# Patient Record
Sex: FEMALE | Race: WHITE | Employment: FULL TIME | URBAN - METROPOLITAN AREA
[De-identification: names, ages, dates, MRNs, and addresses within clinical notes are randomized per-mention and may not be internally consistent; named-entity substitution may affect disease eponyms.]

---

## 2021-12-21 ENCOUNTER — OFFICE VISIT (OUTPATIENT)
Dept: FAMILY MEDICINE CLINIC | Age: 26
End: 2021-12-21
Payer: COMMERCIAL

## 2021-12-21 ENCOUNTER — HOSPITAL ENCOUNTER (OUTPATIENT)
Dept: LAB | Age: 26
Discharge: HOME | End: 2021-12-21
Payer: COMMERCIAL

## 2021-12-21 VITALS
BODY MASS INDEX: 39.34 KG/M2 | OXYGEN SATURATION: 99 % | WEIGHT: 208.38 LBS | SYSTOLIC BLOOD PRESSURE: 128 MMHG | DIASTOLIC BLOOD PRESSURE: 66 MMHG | HEIGHT: 61 IN | HEART RATE: 101 BPM | RESPIRATION RATE: 18 BRPM | TEMPERATURE: 98 F

## 2021-12-21 DIAGNOSIS — F32.A ANXIETY AND DEPRESSION: ICD-10-CM

## 2021-12-21 DIAGNOSIS — E66.9 OBESITY (BMI 30-39.9): ICD-10-CM

## 2021-12-21 DIAGNOSIS — F41.9 ANXIETY AND DEPRESSION: ICD-10-CM

## 2021-12-21 DIAGNOSIS — J45.40 MODERATE PERSISTENT ASTHMA, UNSPECIFIED WHETHER COMPLICATED: ICD-10-CM

## 2021-12-21 DIAGNOSIS — J45.40 MODERATE PERSISTENT ASTHMA, UNSPECIFIED WHETHER COMPLICATED: Primary | ICD-10-CM

## 2021-12-21 LAB
ALBUMIN SERPL-MCNC: 3.9 G/DL (ref 3.4–5)
ALBUMIN/GLOB SERPL: 1.1 {RATIO} (ref 0.8–1.7)
ALP SERPL-CCNC: 98 U/L (ref 45–117)
ALT SERPL-CCNC: 42 U/L (ref 13–56)
ANION GAP SERPL CALC-SCNC: 5 MMOL/L (ref 3–18)
AST SERPL-CCNC: 22 U/L (ref 10–38)
BASOPHILS # BLD: 0 K/UL (ref 0–0.1)
BASOPHILS NFR BLD: 0 % (ref 0–2)
BILIRUB SERPL-MCNC: 0.8 MG/DL (ref 0.2–1)
BUN SERPL-MCNC: 7 MG/DL (ref 7–18)
BUN/CREAT SERPL: 8 (ref 12–20)
CALCIUM SERPL-MCNC: 9.6 MG/DL (ref 8.5–10.1)
CHLORIDE SERPL-SCNC: 105 MMOL/L (ref 100–111)
CO2 SERPL-SCNC: 29 MMOL/L (ref 21–32)
CREAT SERPL-MCNC: 0.89 MG/DL (ref 0.6–1.3)
DIFFERENTIAL METHOD BLD: ABNORMAL
EOSINOPHIL # BLD: 0 K/UL (ref 0–0.4)
EOSINOPHIL NFR BLD: 1 % (ref 0–5)
ERYTHROCYTE [DISTWIDTH] IN BLOOD BY AUTOMATED COUNT: 12.9 % (ref 11.6–14.5)
GLOBULIN SER CALC-MCNC: 3.7 G/DL (ref 2–4)
GLUCOSE SERPL-MCNC: 89 MG/DL (ref 74–99)
HCT VFR BLD AUTO: 46.3 % (ref 35–45)
HGB BLD-MCNC: 14.8 G/DL (ref 12–16)
IMM GRANULOCYTES # BLD AUTO: 0 K/UL (ref 0–0.04)
IMM GRANULOCYTES NFR BLD AUTO: 0 % (ref 0–0.5)
LYMPHOCYTES # BLD: 1.9 K/UL (ref 0.9–3.6)
LYMPHOCYTES NFR BLD: 25 % (ref 21–52)
MCH RBC QN AUTO: 27.7 PG (ref 24–34)
MCHC RBC AUTO-ENTMCNC: 32 G/DL (ref 31–37)
MCV RBC AUTO: 86.5 FL (ref 78–100)
MONOCYTES # BLD: 0.5 K/UL (ref 0.05–1.2)
MONOCYTES NFR BLD: 7 % (ref 3–10)
NEUTS SEG # BLD: 4.9 K/UL (ref 1.8–8)
NEUTS SEG NFR BLD: 67 % (ref 40–73)
NRBC # BLD: 0 K/UL (ref 0–0.01)
NRBC BLD-RTO: 0 PER 100 WBC
PLATELET # BLD AUTO: 297 K/UL (ref 135–420)
PMV BLD AUTO: 10.8 FL (ref 9.2–11.8)
POTASSIUM SERPL-SCNC: 4.4 MMOL/L (ref 3.5–5.5)
PROT SERPL-MCNC: 7.6 G/DL (ref 6.4–8.2)
RBC # BLD AUTO: 5.35 M/UL (ref 4.2–5.3)
SODIUM SERPL-SCNC: 139 MMOL/L (ref 136–145)
TSH SERPL DL<=0.05 MIU/L-ACNC: 3.45 UIU/ML (ref 0.36–3.74)
WBC # BLD AUTO: 7.3 K/UL (ref 4.6–13.2)

## 2021-12-21 PROCEDURE — 99204 OFFICE O/P NEW MOD 45 MIN: CPT | Performed by: STUDENT IN AN ORGANIZED HEALTH CARE EDUCATION/TRAINING PROGRAM

## 2021-12-21 PROCEDURE — 84443 ASSAY THYROID STIM HORMONE: CPT

## 2021-12-21 PROCEDURE — 36415 COLL VENOUS BLD VENIPUNCTURE: CPT

## 2021-12-21 PROCEDURE — 85025 COMPLETE CBC W/AUTO DIFF WBC: CPT

## 2021-12-21 PROCEDURE — 80053 COMPREHEN METABOLIC PANEL: CPT

## 2021-12-21 RX ORDER — BUDESONIDE AND FORMOTEROL FUMARATE DIHYDRATE 160; 4.5 UG/1; UG/1
2 AEROSOL RESPIRATORY (INHALATION) 2 TIMES DAILY
COMMUNITY
End: 2021-12-21 | Stop reason: SDUPTHER

## 2021-12-21 RX ORDER — ALBUTEROL SULFATE 90 UG/1
2 AEROSOL, METERED RESPIRATORY (INHALATION)
COMMUNITY
End: 2022-01-11

## 2021-12-21 RX ORDER — BUDESONIDE AND FORMOTEROL FUMARATE DIHYDRATE 160; 4.5 UG/1; UG/1
2 AEROSOL RESPIRATORY (INHALATION) 2 TIMES DAILY
Qty: 10.2 G | Refills: 0 | Status: SHIPPED | OUTPATIENT
Start: 2021-12-21 | End: 2022-01-11 | Stop reason: SDUPTHER

## 2021-12-21 NOTE — PATIENT INSTRUCTIONS
Anxiety Disorder: Care Instructions  Your Care Instructions     Anxiety is a normal reaction to stress. Difficult situations can cause you to have symptoms such as sweaty palms and a nervous feeling. In an anxiety disorder, the symptoms are far more severe. Constant worry, muscle tension, trouble sleeping, nausea and diarrhea, and other symptoms can make normal daily activities difficult or impossible. These symptoms may occur for no reason, and they can affect your work, school, or social life. Medicines, counseling, and self-care can all help. Follow-up care is a key part of your treatment and safety. Be sure to make and go to all appointments, and call your doctor if you are having problems. It's also a good idea to know your test results and keep a list of the medicines you take. How can you care for yourself at home? · Take medicines exactly as directed. Call your doctor if you think you are having a problem with your medicine. · Go to your counseling sessions and follow-up appointments. · Recognize and accept your anxiety. Then, when you are in a situation that makes you anxious, say to yourself, \"This is not an emergency. I feel uncomfortable, but I am not in danger. I can keep going even if I feel anxious. \"  · Be kind to your body:  ? Relieve tension with exercise or a massage. ? Get enough rest.  ? Avoid alcohol, caffeine, nicotine, and illegal drugs. They can increase your anxiety level and cause sleep problems. ? Learn and do relaxation techniques. See below for more about these techniques. · Engage your mind. Get out and do something you enjoy. Go to a funny movie, or take a walk or hike. Plan your day. Having too much or too little to do can make you anxious. · Keep a record of your symptoms. Discuss your fears with a good friend or family member, or join a support group for people with similar problems. Talking to others sometimes relieves stress.   · Get involved in social groups, or volunteer to help others. Being alone sometimes makes things seem worse than they are. · Get at least 30 minutes of exercise on most days of the week to relieve stress. Walking is a good choice. You also may want to do other activities, such as running, swimming, cycling, or playing tennis or team sports. Relaxation techniques  Do relaxation exercises 10 to 20 minutes a day. You can play soothing, relaxing music while you do them, if you wish. · Tell others in your house that you are going to do your relaxation exercises. Ask them not to disturb you. · Find a comfortable place, away from all distractions and noise. · Lie down on your back, or sit with your back straight. · Focus on your breathing. Make it slow and steady. · Breathe in through your nose. Breathe out through either your nose or mouth. · Breathe deeply, filling up the area between your navel and your rib cage. Breathe so that your belly goes up and down. · Do not hold your breath. · Breathe like this for 5 to 10 minutes. Notice the feeling of calmness throughout your whole body. As you continue to breathe slowly and deeply, relax by doing the following for another 5 to 10 minutes:  · Tighten and relax each muscle group in your body. You can begin at your toes and work your way up to your head. · Imagine your muscle groups relaxing and becoming heavy. · Empty your mind of all thoughts. · Let yourself relax more and more deeply. · Become aware of the state of calmness that surrounds you. · When your relaxation time is over, you can bring yourself back to alertness by moving your fingers and toes and then your hands and feet and then stretching and moving your entire body. Sometimes people fall asleep during relaxation, but they usually wake up shortly afterward. · Always give yourself time to return to full alertness before you drive a car or do anything that might cause an accident if you are not fully alert.  Never play a relaxation tape while you drive a car. When should you call for help? Call 911 anytime you think you may need emergency care. For example, call if:    · You feel you cannot stop from hurting yourself or someone else. Keep the numbers for these national suicide hotlines: 2-791-515-TALK (8-401.731.4129) and 7-021-SBPVOCU (3-215.542.8942). If you or someone you know talks about suicide or feeling hopeless, get help right away. Watch closely for changes in your health, and be sure to contact your doctor if:    · You have anxiety or fear that affects your life.     · You have symptoms of anxiety that are new or different from those you had before. Where can you learn more? Go to http://www.maloney.com/  Enter P754 in the search box to learn more about \"Anxiety Disorder: Care Instructions. \"  Current as of: June 16, 2021               Content Version: 13.0  © 7359-9554 [employer]. Care instructions adapted under license by [employer] (which disclaims liability or warranty for this information). If you have questions about a medical condition or this instruction, always ask your healthcare professional. [place] [address] any warranty or liability for your use of this information. Learning About Asthma Triggers  What are asthma triggers? When you have asthma, some things can make your symptoms worse. These things are called triggers. Things that you're allergic to can trigger your asthma. These may include dust or dust mites, cockroach droppings, pet dander, or mold. Other things can also trigger your asthma, like smoke, colds or the flu, or air pollution. How do asthma triggers affect you? Triggers can make it harder for your lungs to work as they should. They can lead to sudden breathing problems and other symptoms. When you are around a trigger, an asthma attack is more likely.  If your symptoms are severe, you may need emergency treatment or have to go to the hospital for treatment. What can you do to avoid triggers? The best way to avoid your asthma triggers is to know what your triggers are. When you are having symptoms, note the things around you that might be causing them. Then look for patterns that may be triggering your symptoms. Record your triggers on a piece of paper or in an asthma diary. When you have your list of possible triggers, work with your doctor to find ways to avoid them. Here are some ways to avoid a few common triggers. · Don't smoke or allow others to smoke around you. If you need help quitting, talk to your doctor about stop-smoking programs and medicines. These can increase your chances of quitting for good. · If there is a lot of pollution, pollen, or dust outside, stay at home and keep your windows closed. Use an air conditioner or air filter in your home. Check your local weather report or newspaper for air quality and pollen reports. · Avoid colds and flu. Get a flu vaccine every year, as soon as it's available. If you must be around people with colds or the flu, wash your hands often. · Talk to your doctor about getting a pneumococcal vaccine shot. If you have had one before, ask your doctor if you need a second dose. To help prevent problems before they occur, take your controller medicine every day, not only when you have symptoms. Where can you learn more? Go to http://www.Appoet.com/  Enter S900 in the search box to learn more about \"Learning About Asthma Triggers. \"  Current as of: July 6, 2021               Content Version: 13.0  © 2006-2021 Healthwise, Incorporated. Care instructions adapted under license by [employer] (which disclaims liability or warranty for this information).  If you have questions about a medical condition or this instruction, always ask your healthcare professional. [place] [address] any warranty or liability for your use of this information.

## 2021-12-21 NOTE — PROGRESS NOTES
Samia Parsons, 31 y.o.,  female presents as a new patient  Chief Complaint   Patient presents with    New Patient    Asthma    Depression     positve PHQ9 screening at today's visit        SUBJECTIVE  Asthma  Diagnosed with asthma in high school. Several hospitalisation in the past.  [facility] [place] in January 2020 and noted improvement in condition. Aggravating factors: Cold, upper respiratory tract infection  Currently on symbicort inhaler, once a day   Admitted to [facility] on 2/25/2020 with acute hypoxemic respiratory failure due to pneumonia and asthma exacerbation. Allergic rhinitis  Seasonal allergic rhinitis (nasal congestion). Controlled on Zyrtec  Depression  Longstanding problem, since high school. Social anxiety. Treatment: CBT in high school. Denies taking medications. PHQ depression screening completed in the office today and as follows:  3 most recent PHQ Screens 12/21/2021   Little interest or pleasure in doing things More than half the days   Feeling down, depressed, irritable, or hopeless Nearly every day   Total Score PHQ 2 5   Trouble falling or staying asleep, or sleeping too much More than half the days   Feeling tired or having little energy Several days   Poor appetite, weight loss, or overeating More than half the days   Feeling bad about yourself - or that you are a failure or have let yourself or your family down More than half the days   Trouble concentrating on things such as school, work, reading, or watching TV Nearly every day   Moving or speaking so slowly that other people could have noticed; or the opposite being so fidgety that others notice Not at all   Thoughts of being better off dead, or hurting yourself in some way Not at all   PHQ 9 Score 15   How difficult have these problems made it for you to do your work, take care of your home and get along with others Somewhat difficult   C-SSRS completed.    Surgical history is remarkable of cholecystectomy in 2018    ROS:  Pertinent items are noted in HPI    OBJECTIVE    Physical Exam:     Visit Vitals  /66 (BP 1 Location: Left arm, BP Patient Position: Sitting, BP Cuff Size: Large adult)   Pulse (!) 101   Temp 98 °F (36.7 °C) (Temporal)   Resp 18   Ht 5' 1\" (1.549 m)   Wt 208 lb 6 oz (94.5 kg)   LMP 12/17/2021   SpO2 99%   BMI 39.37 kg/m²     General: alert, well-appearing, in no apparent distress or pain  Head: atraumatic. Non-tender maxillary and frontal sinuses  Eyes: Lids with no discharge, no matting, conjunctivae clear and non injected, full EOMs, PERLLA  Ears: pinna non-tender, external auditory canal patent, TM intact  Mouth/throat: teeth, gums, palate normal appearing, moist oral mucosa, tonsils non enlarged, pharynx non erythematous and no lesion  Neck: supple, no JVD , no carotid bruit, no adenopathy palpated  CVS: normal rate, regular rhythm, distinct S1 and S2, no murmurs, rubs clicks or gallops  Lungs: Breathing not labored, good chest excursion, clear to ausculation bilaterally, no crackles, wheezing or rhonchi noted  Abdomen: normoactive bowel sounds, soft, non-tender, no organomegaly  Extremities: no edema, no cyanosis,  Skin: warm, no lesions, rashes noted  Psych: alert and oriented x3, mood, insight, speech and affect normal    ASSESSMENT/PLAN  Diagnoses and all orders for this visit:    1. Moderate persistent asthma, unspecified whether complicated  -     budesonide-formoteroL (SYMBICORT) 160-4.5 mcg/actuation HFAA; Take 2 Puffs by inhalation two (2) times a day. -     CBC WITH AUTOMATED DIFF; Future  -     METABOLIC PANEL, COMPREHENSIVE; Future  Labs ordered. Continue with SMART therapy. Medication refilled. 2. Anxiety and depression  -     TSH 3RD GENERATION; Future  -     REFERRAL TO PSYCHOLOGY  Labs ordered to rule out possible somatic causes of anxiety. Treatment options discussed with the patient. Shared decision making performed. Referral to psychology placed. Self management of depression discussed. Healthy diet, regular sleep schedule, physical activity, reduce stress, maintain a balance, practice mindfulness, seek support from friends and family. Handout given. Patient advised to call the office for any worsening symptoms of depression. 3. Obesity (BMI 30-39.9)  -     CBC WITH AUTOMATED DIFF; Future  -     METABOLIC PANEL, COMPREHENSIVE; Future  -     TSH 3RD GENERATION; Future  Labs ordered. Managing weight with diet and exercise recommended. Examples of moderate physical activity, wellness workout, diet program (Weight Watches, Diet Workshop) discussed with the patient. Handout given. Follow-up and Dispositions    · Return in about 4 weeks (around 1/18/2022) for physical and fasting lans to be done 3-5 days prior .

## 2021-12-21 NOTE — PROGRESS NOTES
Chief Complaint   Patient presents with    New Patient    Asthma    Depression     positve PHQ9 screening at today's visit      1. \"Have you been to the ER, urgent care clinic since your last visit? Hospitalized since your last visit? \" No    2. \"Have you seen or consulted any other health care providers outside of the [address] [place] [address] since your last visit? \" No     3. For patients aged 39-70: Has the patient had a colonoscopy / FIT/ Cologuard? NA based on age or sex     If the patient is female:    3. For patients aged 41-77: Has the patient had a mammogram within the past 2 years? NA based on age or sex    11. For patients aged 21-65: Has the patient had a pap smear? No (not sexual active)     No immunization record noted on [employer].

## 2022-01-10 PROBLEM — J45.909 ASTHMA: Status: ACTIVE | Noted: 2020-02-25

## 2022-01-10 PROBLEM — J96.01 ACUTE RESPIRATORY FAILURE WITH HYPOXEMIA (HCC): Status: ACTIVE | Noted: 2020-02-25

## 2022-01-11 ENCOUNTER — OFFICE VISIT (OUTPATIENT)
Dept: FAMILY MEDICINE CLINIC | Age: 27
End: 2022-01-11
Payer: COMMERCIAL

## 2022-01-11 VITALS
SYSTOLIC BLOOD PRESSURE: 120 MMHG | HEART RATE: 100 BPM | DIASTOLIC BLOOD PRESSURE: 76 MMHG | OXYGEN SATURATION: 100 % | TEMPERATURE: 98 F | RESPIRATION RATE: 18 BRPM | HEIGHT: 61 IN | WEIGHT: 201 LBS | BODY MASS INDEX: 37.95 KG/M2

## 2022-01-11 DIAGNOSIS — F41.9 ANXIETY AND DEPRESSION: ICD-10-CM

## 2022-01-11 DIAGNOSIS — F32.A ANXIETY AND DEPRESSION: ICD-10-CM

## 2022-01-11 DIAGNOSIS — E66.9 OBESITY (BMI 30-39.9): ICD-10-CM

## 2022-01-11 DIAGNOSIS — J45.40 MODERATE PERSISTENT ASTHMA, UNSPECIFIED WHETHER COMPLICATED: ICD-10-CM

## 2022-01-11 DIAGNOSIS — Z23 ENCOUNTER FOR IMMUNIZATION: ICD-10-CM

## 2022-01-11 DIAGNOSIS — Z00.00 WELL WOMAN EXAM (NO GYNECOLOGICAL EXAM): Primary | ICD-10-CM

## 2022-01-11 PROCEDURE — 90715 TDAP VACCINE 7 YRS/> IM: CPT | Performed by: STUDENT IN AN ORGANIZED HEALTH CARE EDUCATION/TRAINING PROGRAM

## 2022-01-11 PROCEDURE — 99385 PREV VISIT NEW AGE 18-39: CPT | Performed by: STUDENT IN AN ORGANIZED HEALTH CARE EDUCATION/TRAINING PROGRAM

## 2022-01-11 RX ORDER — BUDESONIDE AND FORMOTEROL FUMARATE DIHYDRATE 160; 4.5 UG/1; UG/1
2 AEROSOL RESPIRATORY (INHALATION) 2 TIMES DAILY
Qty: 10.2 G | Refills: 0 | Status: SHIPPED | OUTPATIENT
Start: 2022-01-11 | End: 2022-01-11

## 2022-01-11 RX ORDER — BUDESONIDE AND FORMOTEROL FUMARATE DIHYDRATE 160; 4.5 UG/1; UG/1
2 AEROSOL RESPIRATORY (INHALATION) 2 TIMES DAILY
Qty: 10.2 G | Refills: 0 | Status: SHIPPED | OUTPATIENT
Start: 2022-01-11

## 2022-01-11 RX ORDER — ESCITALOPRAM OXALATE 5 MG/1
5 TABLET ORAL DAILY
Qty: 30 TABLET | Refills: 0 | Status: SHIPPED | OUTPATIENT
Start: 2022-01-11 | End: 2022-01-11

## 2022-01-11 RX ORDER — ESCITALOPRAM OXALATE 5 MG/1
5 TABLET ORAL DAILY
Qty: 30 TABLET | Refills: 0 | Status: SHIPPED | OUTPATIENT
Start: 2022-01-11 | End: 2022-02-11 | Stop reason: DRUGHIGH

## 2022-01-11 NOTE — PROGRESS NOTES
Chief Complaint   Patient presents with    Physical    Depression    Results     review of results      1. \"Have you been to the ER, urgent care clinic since your last visit? Hospitalized since your last visit? \" No    2. \"Have you seen or consulted any other health care providers outside of the [address] [place] [address] since your last visit? \" No     3. For patients aged 39-70: Has the patient had a colonoscopy / FIT/ Cologuard? No     If the patient is female:    4. For patients aged 41-77: Has the patient had a mammogram within the past 2 years? No    5. For patients aged 21-65: Has the patient had a pap smear? No (never been sexual active -does not want a pap smear)      Physician order obtained. Patient completed adult immunization consent form. Allergies, contraindications and recommendations reviewed with patient. Tdap vaccine administered IM right deltoid. Patient tolerated well.   Patient remained in office for 15 minutes after injection and no adverse reactions were noted.     Lot # [id number]  Exp Date: 11-  [address] # [id number]

## 2022-01-11 NOTE — PATIENT INSTRUCTIONS
Well Visit, Ages 25 to 48: Care Instructions  Overview     Well visits can help you stay healthy. Your doctor has checked your overall health and may have suggested ways to take good care of yourself. Your doctor also may have recommended tests. At home, you can help prevent illness with healthy eating, regular exercise, and other steps. Follow-up care is a key part of your treatment and safety. Be sure to make and go to all appointments, and call your doctor if you are having problems. It's also a good idea to know your test results and keep a list of the medicines you take. How can you care for yourself at home? · Get screening tests that you and your doctor decide on. Screening helps find diseases before any symptoms appear. · Eat healthy foods. Choose fruits, vegetables, whole grains, protein, and low-fat dairy foods. Limit fat, especially saturated fat. Reduce salt in your diet. · Limit alcohol. If you are a man, have no more than 2 drinks a day or 14 drinks a week. If you are a woman, have no more than 1 drink a day or 7 drinks a week. · Get at least 30 minutes of physical activity on most days of the week. Walking is a good choice. You also may want to do other activities, such as running, swimming, cycling, or playing tennis or team sports. Discuss any changes in your exercise program with your doctor. · Reach and stay at a healthy weight. This will lower your risk for many problems, such as obesity, diabetes, heart disease, and high blood pressure. · Do not smoke or allow others to smoke around you. If you need help quitting, talk to your doctor about stop-smoking programs and medicines. These can increase your chances of quitting for good. · Care for your mental health. It is easy to get weighed down by worry and stress. Learn strategies to manage stress, like deep breathing and mindfulness, and stay connected with your family and community.  If you find you often feel sad or hopeless, talk with your doctor. Treatment can help. · Talk to your doctor about whether you have any risk factors for sexually transmitted infections (STIs). You can help prevent STIs if you wait to have sex with a new partner (or partners) until you've each been tested for STIs. It also helps if you use condoms (male or female condoms) and if you limit your sex partners to one person who only has sex with you. Vaccines are available for some STIs, such as HPV. · Use birth control if it's important to you to prevent pregnancy. Talk with your doctor about the choices available and what might be best for you. · If you think you may have a problem with alcohol or drug use, talk to your doctor. This includes prescription medicines (such as amphetamines and opioids) and illegal drugs (such as cocaine and methamphetamine). Your doctor can help you figure out what type of treatment is best for you. · Protect your skin from too much sun. When you're outdoors from 10 a.m. to 4 p.m., stay in the shade or cover up with clothing and a hat with a wide brim. Wear sunglasses that block UV rays. Even when it's cloudy, put broad-spectrum sunscreen (SPF 30 or higher) on any exposed skin. · See a dentist one or two times a year for checkups and to have your teeth cleaned. · Wear a seat belt in the car. When should you call for help? Watch closely for changes in your health, and be sure to contact your doctor if you have any problems or symptoms that concern you. Where can you learn more? Go to http://www.Haiku Deck.com/  Enter P072 in the search box to learn more about \"Well Visit, Ages 25 to 48: Care Instructions. \"  Current as of: February 11, 2021               Content Version: 13.0  © 4407-4344 Healthwise, Incorporated. Care instructions adapted under license by [employer] (which disclaims liability or warranty for this information).  If you have questions about a medical condition or this instruction, always ask your healthcare professional. [place] [address] any warranty or liability for your use of this information. Tdap (Tetanus, Diphtheria, Pertussis) Vaccine: What You Need to Know  Why get vaccinated? Tdap vaccine can prevent tetanus, diphtheria, and pertussis. Diphtheria and pertussis spread from person to person. Tetanus enters the body through cuts or wounds. · TETANUS (T) causes painful stiffening of the muscles. Tetanus can lead to serious health problems, including being unable to open the mouth, having trouble swallowing and breathing, or death. · DIPHTHERIA (D) can lead to difficulty breathing, heart failure, paralysis, or death. · PERTUSSIS (aP), also known as \"whooping cough,\" can cause uncontrollable, violent coughing which makes it hard to breathe, eat, or drink. Pertussis can be extremely serious in babies and young children, causing pneumonia, convulsions, brain damage, or death. In teens and adults, it can cause weight loss, loss of bladder control, passing out, and rib fractures from severe coughing. Tdap vaccine  Tdap is only for children 7 years and older, adolescents, and adults. Adolescents should receive a single dose of Tdap, preferably at age 6 or 15 years. Pregnant women should get a dose of Tdap during every pregnancy, to protect the  from pertussis. Infants are most at risk for severe, life threatening complications from pertussis. Adults who have never received Tdap should get a dose of Tdap. Also, adults should receive a booster dose every 10 years, or earlier in the case of a severe and dirty wound or burn. Booster doses can be either Tdap or Td (a different vaccine that protects against tetanus and diphtheria but not pertussis). Tdap may be given at the same time as other vaccines.   Talk with your health care provider  Tell your vaccine provider if the person getting the vaccine:  · Has had an allergic reaction after a previous dose of any vaccine that protects against tetanus, diphtheria, or pertussis, or has any severe, life threatening allergies. · Has had a coma, decreased level of consciousness, or prolonged seizures within 7 days after a previous dose of any pertussis vaccine (DTP, DTaP, or Tdap). · Has seizures or another nervous system problem. · Has ever had Guillain-Barré Syndrome (also called GBS). · Has had severe pain or swelling after a previous dose of any vaccine that protects against tetanus or diphtheria. In some cases, your health care provider may decide to postpone Tdap vaccination to a future visit. People with minor illnesses, such as a cold, may be vaccinated. People who are moderately or severely ill should usually wait until they recover before getting Tdap vaccine. Your health care provider can give you more information. Risks of a vaccine reaction  · Pain, redness, or swelling where the shot was given, mild fever, headache, feeling tired, and nausea, vomiting, diarrhea, or stomachache sometimes happen after Tdap vaccine. People sometimes faint after medical procedures, including vaccination. Tell your provider if you feel dizzy or have vision changes or ringing in the ears. As with any medicine, there is a very remote chance of a vaccine causing a severe allergic reaction, other serious injury, or death. What if there is a serious problem? An allergic reaction could occur after the vaccinated person leaves the clinic. If you see signs of a severe allergic reaction (hives, swelling of the face and throat, difficulty breathing, a fast heartbeat, dizziness, or weakness), call 9-1-1 and get the person to the nearest hospital.  For other signs that concern you, call your health care provider. Adverse reactions should be reported to the Vaccine Adverse Event Reporting System (VAERS). Your health care provider will usually file this report, or you can do it yourself. Visit the VAERS website at www.vaers. hhs.gov or call 2-936.661.8446. VAERS is only for reporting reactions, and [facility] staff do not give medical advice. The National Vaccine Injury Compensation Program  The National Vaccine Injury Compensation Program (VICP) is a federal program that was created to compensate people who may have been injured by certain vaccines. Visit the VICP website at www.hrsa.gov/vaccinecompensation or call 9-904.193.7557 to learn about the program and about filing a claim. There is a time limit to file a claim for compensation. How can I learn more? · Ask your health care provider. · Call your local or state health department. · Contact the Centers for Disease Control and Prevention (CDC):  ? Call 6-959.218.9315 (3-375-CGJ-INFO) or  ? Visit CDC's website at www.cdc.gov/vaccines  Vaccine Information Statement (Interim)  Tdap (Tetanus, Diphtheria, Pertussis) Vaccine  04/01/2020  [address] [id number]  Department of Health and Human Services  Centers for Disease Control and Prevention  Many Vaccine Information Statements are available in Amharic and other languages. See www.immunize.org/vis. Muchas hojas de información sobre vacunas están disponibles en español y en otros idiomas. Visite www.immunize.org/vis. Care instructions adapted under license by [employer] (which disclaims liability or warranty for this information). If you have questions about a medical condition or this instruction, always ask your healthcare professional. [place] [address] any warranty or liability for your use of this information.     [facility] contact information [phone]

## 2022-01-11 NOTE — PROGRESS NOTES
Subjective:   31 y.o. female for Well Woman Check. Her gyne and breast care is done elsewhere by her Ob-Gyne physician. Patient Active Problem List   Diagnosis Code    Acute respiratory failure with hypoxemia (HCC) J96.01    Asthma J45.909    Anxiety and depression F41.9, F32. A     Patient Active Problem List    Diagnosis Date Noted    Anxiety and depression 01/11/2022    Acute respiratory failure with hypoxemia ([address] 02/25/2020    Asthma 02/25/2020     Current Outpatient Medications   Medication Sig Dispense Refill    budesonide-formoteroL (SYMBICORT) 160-4.5 mcg/actuation HFAA Take 2 Puffs by inhalation two (2) times a day. 10.2 g 0    escitalopram oxalate (LEXAPRO) 5 mg tablet Take 1 Tablet by mouth daily. 30 Tablet 0     No Known Allergies  No past medical history on file. No past surgical history on file. No family history on file. Social History     Tobacco Use    Smoking status: Never Smoker    Smokeless tobacco: Never Used   Substance Use Topics    Alcohol use: Yes     Comment: Rare        Lab Results   Component Value Date/Time    WBC 7.3 12/21/2021 11:13 AM    HGB 14.8 12/21/2021 11:13 AM    HCT 46.3 (H) 12/21/2021 11:13 AM    PLATELET 653 63/72/5471 11:13 AM    MCV 86.5 12/21/2021 11:13 AM     Lab Results   Component Value Date/Time    Glucose 89 12/21/2021 11:13 AM    Creatinine 0.89 12/21/2021 11:13 AM      No results found for: CHOL, CHOLPOCT, HDL, LDL, LDLC, LDLCPOC, LDLCEXT, TRIGL, TGLPOCT, CHHD, CHHDX  Lab Results   Component Value Date/Time    ALT (SGPT) 42 12/21/2021 11:13 AM    Alk.  phosphatase 98 12/21/2021 11:13 AM    Bilirubin, total 0.8 12/21/2021 11:13 AM    Albumin 3.9 12/21/2021 11:13 AM    Protein, total 7.6 12/21/2021 11:13 AM    PLATELET 908 89/35/6198 11:13 AM     Lab Results   Component Value Date/Time    TSH 3.45 12/21/2021 11:13 AM      Lab Results   Component Value Date/Time    Sodium 139 12/21/2021 11:13 AM    Potassium 4.4 12/21/2021 11:13 AM    Chloride 105 12/21/2021 11:13 AM    CO2 29 12/21/2021 11:13 AM    Anion gap 5 12/21/2021 11:13 AM    Glucose 89 12/21/2021 11:13 AM    BUN 7 12/21/2021 11:13 AM    Creatinine 0.89 12/21/2021 11:13 AM    BUN/Creatinine ratio 8 (L) 12/21/2021 11:13 AM    GFR est AA >60 12/21/2021 11:13 AM    GFR est non-AA >60 12/21/2021 11:13 AM    Calcium 9.6 12/21/2021 11:13 AM      Lab Results   Component Value Date/Time    Sodium 139 12/21/2021 11:13 AM    Potassium 4.4 12/21/2021 11:13 AM    Chloride 105 12/21/2021 11:13 AM    CO2 29 12/21/2021 11:13 AM    Anion gap 5 12/21/2021 11:13 AM    Glucose 89 12/21/2021 11:13 AM    BUN 7 12/21/2021 11:13 AM    Creatinine 0.89 12/21/2021 11:13 AM    BUN/Creatinine ratio 8 (L) 12/21/2021 11:13 AM    GFR est AA >60 12/21/2021 11:13 AM    GFR est non-AA >60 12/21/2021 11:13 AM    Calcium 9.6 12/21/2021 11:13 AM    Bilirubin, total 0.8 12/21/2021 11:13 AM    ALT (SGPT) 42 12/21/2021 11:13 AM    Alk. phosphatase 98 12/21/2021 11:13 AM    Protein, total 7.6 12/21/2021 11:13 AM    Albumin 3.9 12/21/2021 11:13 AM    Globulin 3.7 12/21/2021 11:13 AM    A-G Ratio 1.1 12/21/2021 11:13 AM       ROS: Feeling generally well. No TIA's or unusual headaches, no dysphagia. No prolonged cough. No dyspnea or chest pain on exertion. No abdominal pain, change in bowel habits, black or bloody stools. No urinary tract symptoms. No new or unusual musculoskeletal symptoms. Specific concerns today:   1. Asthma. Diagnosed with asthma in high school. Several hospitalisation in the past.  Relocated from [facility] to [place] in January 2020 and noted improvement in condition. Aggravating factors: Cold, upper respiratory tract infection. COVID-19 vaccinated. Currently on symbicort inhaler, once a day. Exhausted albuterol and requests refill. 2. Anxiety and depression  Longstanding problem, since high school. Social anxiety. Treatment: CBT in high school. Denies taking medications.   PHQ-9 depression screening completed in the office today and as follows:  3 most recent PHQ Screens 1/11/2022   Little interest or pleasure in doing things More than half the days   Feeling down, depressed, irritable, or hopeless More than half the days   Total Score PHQ 2 4   Trouble falling or staying asleep, or sleeping too much Nearly every day   Feeling tired or having little energy More than half the days   Poor appetite, weight loss, or overeating More than half the days   Feeling bad about yourself - or that you are a failure or have let yourself or your family down Nearly every day   Trouble concentrating on things such as school, work, reading, or watching TV Nearly every day   Moving or speaking so slowly that other people could have noticed; or the opposite being so fidgety that others notice Not at all   Thoughts of being better off dead, or hurting yourself in some way Not at all   PHQ 9 Score 17   How difficult have these problems made it for you to do your work, take care of your home and get along with others Somewhat difficult     ERMELINDA-7 anxiety screening completed in the office today and as follows:  ERMELINDA 2/7 1/11/2022   Feeling nervous, anxious or on edge? 2   Not being able to stop or control worrying? 2   ERMELINDA-2 Subtotal 4   Worrying too much about different things? 2   Trouble relaxing? 2   Being so restless that it is hard to sit still? 3   Becoming easily annoyed or irritable? 0   Feeling afraid as if something awful might happen? 1   ERMELINDA-7 Total Score 12     3. Obesity:  Estimated body mass index is 37.98 kg/m², weight of this encounter: 201 lb. Patient lost 7 lbs for 1 month. Objective: The patient appears well, alert, oriented x 3, in no distress.   Visit Vitals  /76 (BP 1 Location: Left arm, BP Patient Position: Sitting, BP Cuff Size: Large adult)   Pulse 100   Temp 98 °F (36.7 °C) (Temporal)   Resp 18   Ht 5' 1\" (1.549 m)   Wt 201 lb (91.2 kg)   LMP 12/17/2021   SpO2 100%   BMI 37.98 kg/m²     ENT normal.  Neck supple. No adenopathy or thyromegaly. RADHA. Lungs are clear, good air entry, no wheezes, rhonchi or rales. S1 and S2 normal, no murmurs, regular rate and rhythm. Abdomen soft without tenderness, guarding, mass or organomegaly. Extremities show no edema, normal peripheral pulses. Neurological is normal, no focal findings. Breast and Pelvic exams are deferred. Assessment/Plan:       ICD-10-CM ICD-9-CM    1. Well woman exam (no gynecological exam)  Z00.00 V70.0 Well Woman  lose weight, increase physical activity, healthy diet, regular sleep schedule, reduce stress, maintain a balance, call if any problems    [V70.0]   2. Moderate persistent asthma, unspecified whether complicated  A61.48 546.58 budesonide-formoteroL (SYMBICORT) 160-4.5 mcg/actuation HFAA      SMART therapy discussed and recommended. 3. Anxiety and depression  F41.9 300.00 escitalopram oxalate (LEXAPRO) 5 mg tablet    F32. A 311 PHQ 9 Score: 17, C-SSRS completed. ERMELINDA 7 score: 12. Treatment options discussed with the patient. Shared decision making performed. Advised to begin taking Lexapro 5 mg daily. Referral to psychology placed on 12/21/2021 but not completed. Pt advised to call the office to schedule appointment. Information provided. Self management of anxiety and depression discussed. Healthy diet, regular sleep schedule, physical activity, reduce stress, maintain a balance, practice mindfulness, seek support from friends and family. Call the office for any worsening symptoms of depression. Patient will return in 1 month for follow-up. 4. Encounter for immunization  Z23 V03.89 TETANUS, DIPHTHERIA TOXOIDS AND ACELLULAR PERTUSSIS VACCINE (TDAP), IN INDIVIDS. >=7, IM      VA IMMUNIZ ADMIN,1 SINGLE/COMB VAC/TOXOID   5. Obesity (BMI 30-39. 9)  E66.9 278.00 Patient will continue managing weight with diet and regular exercise.      Follow-up and Dispositions    · Return in about 1 month (around 2/11/2022) for anxiety and depression. Medical dictation software was used for portions of this report. Unintended voice recognition errors may occur.

## 2022-02-11 ENCOUNTER — OFFICE VISIT (OUTPATIENT)
Dept: FAMILY MEDICINE CLINIC | Age: 27
End: 2022-02-11
Payer: COMMERCIAL

## 2022-02-11 VITALS
SYSTOLIC BLOOD PRESSURE: 124 MMHG | WEIGHT: 200 LBS | TEMPERATURE: 98.1 F | BODY MASS INDEX: 37.76 KG/M2 | HEIGHT: 61 IN | RESPIRATION RATE: 18 BRPM | HEART RATE: 97 BPM | OXYGEN SATURATION: 100 % | DIASTOLIC BLOOD PRESSURE: 80 MMHG

## 2022-02-11 DIAGNOSIS — F32.A ANXIETY AND DEPRESSION: Primary | ICD-10-CM

## 2022-02-11 DIAGNOSIS — F41.9 ANXIETY AND DEPRESSION: Primary | ICD-10-CM

## 2022-02-11 DIAGNOSIS — J45.40 MODERATE PERSISTENT ASTHMA, UNSPECIFIED WHETHER COMPLICATED: ICD-10-CM

## 2022-02-11 DIAGNOSIS — M25.521 RIGHT ELBOW PAIN: ICD-10-CM

## 2022-02-11 PROCEDURE — 99214 OFFICE O/P EST MOD 30 MIN: CPT | Performed by: STUDENT IN AN ORGANIZED HEALTH CARE EDUCATION/TRAINING PROGRAM

## 2022-02-11 RX ORDER — ESCITALOPRAM OXALATE 10 MG/1
10 TABLET ORAL DAILY
Qty: 30 TABLET | Refills: 1 | Status: SHIPPED | OUTPATIENT
Start: 2022-02-11

## 2022-02-11 NOTE — PROGRESS NOTES
Kelly Snyder (: 1995) is a 31 y.o. female, established patient, here for evaluation of the following chief complaint(s): Anxiety, Asthma, Depression, and Elbow Pain (c/o elbow pain along with sharp shooting pain (ongoing past 2 weeks) )       ASSESSMENT/PLAN:  Below is the assessment and plan developed based on review of pertinent history, physical exam, labs, studies, and medications. 1. Anxiety and depression  -     escitalopram oxalate (LEXAPRO) 10 mg tablet; Take 1 Tablet by mouth daily. , Normal, Disp-30 Tablet, R-1  PHQ-9 score today: 2 (Previous score 17). ERMELINDA-7 score today 12 (Previous score 12). The dose of Lexapro adjusted to 10 mg. Pt advised to contact [facility] to schedule an appointment. Contact information provided. Self management of anxiety discussed: Healthy diet, regular sleep schedule, physical activity, reduce stress, maintain a balance, practice mindfulness, seek support from friends and family. · Patient Education:  Reviewed concept of anxiety as biochemical imbalance of neurotransmitters and rationale for treatment. Instructed patient to contact office or on-call physician promptly should condition worsen or any new symptoms appear and provided on-call telephone numbers. IF THE PATIENT HAS ANY SUICIDAL OR HOMICIDAL IDEATION, CALL THE OFFICE, DISCUSS WITH A SUPPORT MEMBER OR GO TO THE ER IMMEDIATELY. Patient was agreeable with this plan. 2. Moderate persistent asthma, unspecified whether complicated  Patient advised to avoid asthma triggers and continue with SMART therapy. Booster dose of Covid-19 vaccination strongly recommended  3. Right elbow pain  Etiology unclear. No tenderness over the medial or lateral epicondyle on physical examination. There is tenderness to palpation along the right brachialis and brachioradialis muscles. Patient advised to avoid aggravating activities.  The patient was instructed to ice the affected area for 15 minutes 2-3 times per day. Take OTC ibuprofen for pain as needed. The patient is to call if condition worsens or fails to improve. All chart history elements were reviewed by me at the time of the visit even though marked at time of note closure. Patient understands our medical plan. Patient has provided input and agrees with goals. Alternatives have been explained and offered. All questions answered. The patient is to call if condition worsens or fails to improve. Return in about 2 months (around 4/11/2022) for anxiety / depression ([address]). SUBJECTIVE/OBJECTIVE:  HPI  2. Anxiety and depression  Longstanding problem, since high school. Social anxiety.  Treatment: CBT in high school. Started on Lexapro 5 mg. Reports improvement, no anxiety at home but still feels nervous and anxious in public. Referral to Behavioral Health placed but not completed. PHQ-9 depression screening completed in the office today and as follows:  3 most recent PHQ Screens 2/11/2022   Little interest or pleasure in doing things Several days   Feeling down, depressed, irritable, or hopeless Several days   Total Score PHQ 2 2   Trouble falling or staying asleep, or sleeping too much -   Feeling tired or having little energy -   Poor appetite, weight loss, or overeating -   Feeling bad about yourself - or that you are a failure or have let yourself or your family down -   Trouble concentrating on things such as school, work, reading, or watching TV -   Moving or speaking so slowly that other people could have noticed; or the opposite being so fidgety that others notice -   Thoughts of being better off dead, or hurting yourself in some way -   PHQ 9 Score -   How difficult have these problems made it for you to do your work, take care of your home and get along with others -     ERMELINDA-7 anxiety screening:   ERMELINDA 2/7 2/11/2022 1/11/2022   Feeling nervous, anxious or on edge? - 2   Not being able to stop or control worrying?  - 2 ERMELINDA-2 Subtotal - 4   Worrying too much about different things? - 2   Trouble relaxing? - 2   Being so restless that it is hard to sit still? - 3   Becoming easily annoyed or irritable? - 0   Feeling afraid as if something awful might happen? - 1   ERMELINDA-7 Total Score - 12   Feeling nervous, anxious, or on edge 3 -   Not being able to stop or control worrying 1 -   Worrying too much about different things 2 -   Trouble relaxing 2 -   Being so restless that it is hard to sit still 3 -   Becoming easily annoyed or irritable 0 -   Feeling afraid as if something awful might happen 1 -   ERMELINDA-7 Total Score 12 -     1. Asthma. Diagnosed with asthma in high school. Several hospitalisation in the past.  Relocated from [facility] to [place] in January 2020 and noted improvement in condition. Aggravating factors: Cold, upper respiratory tract infection. COVID-19 vaccinated, but overdue for booster dose  Asthma currently well-controlled on symbicort inhaler.  Exhausted medication and requests refill. 3. Pain in right elbow   Occasional sharp pain in cubital fossa of the right elbow, come and goes. Pain aggravates at overuse. Patient denies any new activities or recent injury. Has tried brace with no relief.     Review of Systems  Pertinent items are noted in HPI    Physical Exam  Visit Vitals  /80 (BP 1 Location: Left arm, BP Patient Position: Sitting, BP Cuff Size: Large adult)   Pulse 97   Temp 98.1 °F (36.7 °C) (Temporal)   Resp 18   Ht 5' 1\" (1.549 m)   Wt 200 lb (90.7 kg)   SpO2 100%   BMI 37.79 kg/m²     General: alert, well-appearing, in no apparent distress or pain  CVS: normal rate, regular rhythm, distinct S1 and S2  Lungs: Breathing not labored, good chest excursion, clear to ausculation bilaterally, no crackles, wheezing or rhonchi noted  Abdomen: soft, non-tender  Extremities: no edema  Skin: warm, no lesions, rashes noted  Psych: alert and oriented x3  Elbow:  There is no obvious deformity or effusion in the bilateral elbow. The bilateral elbow is stable without evidence of dislocation. There is full range of motion of the bilateral elbow. There is tenderness to palpation along the right brachialis and brachioradialis muscles. The bilateral elbow demonstrates normal muscle tone with 5/5 strength with elbow flexion and extension as well as forearm supination and pronation. An electronic signature was used to authenticate this note. -- Noa Webb PA-C     Medical dictation software was used for portions of this report. Unintended voice recognition errors may occur.

## 2022-02-11 NOTE — PATIENT INSTRUCTIONS
Anxiety Disorder: Care Instructions  Your Care Instructions     Anxiety is a normal reaction to stress. Difficult situations can cause you to have symptoms such as sweaty palms and a nervous feeling. In an anxiety disorder, the symptoms are far more severe. Constant worry, muscle tension, trouble sleeping, nausea and diarrhea, and other symptoms can make normal daily activities difficult or impossible. These symptoms may occur for no reason, and they can affect your work, school, or social life. Medicines, counseling, and self-care can all help. Follow-up care is a key part of your treatment and safety. Be sure to make and go to all appointments, and call your doctor if you are having problems. It's also a good idea to know your test results and keep a list of the medicines you take. How can you care for yourself at home? · Take medicines exactly as directed. Call your doctor if you think you are having a problem with your medicine. · Go to your counseling sessions and follow-up appointments. · Recognize and accept your anxiety. Then, when you are in a situation that makes you anxious, say to yourself, \"This is not an emergency. I feel uncomfortable, but I am not in danger. I can keep going even if I feel anxious. \"  · Be kind to your body:  ? Relieve tension with exercise or a massage. ? Get enough rest.  ? Avoid alcohol, caffeine, nicotine, and illegal drugs. They can increase your anxiety level and cause sleep problems. ? Learn and do relaxation techniques. See below for more about these techniques. · Engage your mind. Get out and do something you enjoy. Go to a funny movie, or take a walk or hike. Plan your day. Having too much or too little to do can make you anxious. · Keep a record of your symptoms. Discuss your fears with a good friend or family member, or join a support group for people with similar problems. Talking to others sometimes relieves stress.   · Get involved in social groups, or volunteer to help others. Being alone sometimes makes things seem worse than they are. · Get at least 30 minutes of exercise on most days of the week to relieve stress. Walking is a good choice. You also may want to do other activities, such as running, swimming, cycling, or playing tennis or team sports. Relaxation techniques  Do relaxation exercises 10 to 20 minutes a day. You can play soothing, relaxing music while you do them, if you wish. · Tell others in your house that you are going to do your relaxation exercises. Ask them not to disturb you. · Find a comfortable place, away from all distractions and noise. · Lie down on your back, or sit with your back straight. · Focus on your breathing. Make it slow and steady. · Breathe in through your nose. Breathe out through either your nose or mouth. · Breathe deeply, filling up the area between your navel and your rib cage. Breathe so that your belly goes up and down. · Do not hold your breath. · Breathe like this for 5 to 10 minutes. Notice the feeling of calmness throughout your whole body. As you continue to breathe slowly and deeply, relax by doing the following for another 5 to 10 minutes:  · Tighten and relax each muscle group in your body. You can begin at your toes and work your way up to your head. · Imagine your muscle groups relaxing and becoming heavy. · Empty your mind of all thoughts. · Let yourself relax more and more deeply. · Become aware of the state of calmness that surrounds you. · When your relaxation time is over, you can bring yourself back to alertness by moving your fingers and toes and then your hands and feet and then stretching and moving your entire body. Sometimes people fall asleep during relaxation, but they usually wake up shortly afterward. · Always give yourself time to return to full alertness before you drive a car or do anything that might cause an accident if you are not fully alert.  Never play a relaxation tape while you drive a car. When should you call for help? Call 911 anytime you think you may need emergency care. For example, call if:    · You feel you cannot stop from hurting yourself or someone else. Keep the numbers for these national suicide hotlines: 5-881-935-TALK (1-419.573.1403) and 9-958-BABHMPR (5-279.949.8755). If you or someone you know talks about suicide or feeling hopeless, get help right away. Watch closely for changes in your health, and be sure to contact your doctor if:    · You have anxiety or fear that affects your life.     · You have symptoms of anxiety that are new or different from those you had before. Where can you learn more? Go to http://www.maloney.com/  Enter P754 in the search box to learn more about \"Anxiety Disorder: Care Instructions. \"  Current as of: June 16, 2021               Content Version: 13.0  © 2006-2021 [employer]. Care instructions adapted under license by [employer] (which disclaims liability or warranty for this information). If you have questions about a medical condition or this instruction, always ask your healthcare professional. [place] [address] any warranty or liability for your use of this information. Generalized Anxiety Disorder: Care Instructions  Overview     We all worry. It's a normal part of life. But when you have generalized anxiety disorder, you worry about lots of things. You have a hard time not worrying. This worry or anxiety interferes with your relationships, work or school, and other areas of your life. You may worry most days about things like money, health, work, or friends. That may make you feel tired, tense, or cranky. It can make it hard to think. It may get in the way of healthy sleep. Counseling and medicine can both work to treat anxiety. They are often used together with lifestyle changes, such as getting enough sleep.  Treatment can include a type of counseling called cognitive-behavioral therapy, or CBT. It helps you notice and replace thoughts that make you worry. You also might have counseling along with those closest to you so that they can help. Follow-up care is a key part of your treatment and safety. Be sure to make and go to all appointments, and call your doctor if you are having problems. It's also a good idea to know your test results and keep a list of the medicines you take. How can you care for yourself at home? · Get at least 30 minutes of exercise on most days of the week. Walking is a good choice. You also may want to do other activities, such as running, swimming, cycling, or playing tennis or team sports. · Learn and do relaxation exercises, such as deep breathing. · Go to bed at the same time every night. Try for 8 to 10 hours of sleep a night. · Avoid alcohol, marijuana, and illegal drugs. · Find a counselor who uses cognitive-behavioral therapy (CBT). · Don't isolate yourself. Let those closest to you help you. Find someone you can trust and confide in. Talk to that person. · Be safe with medicines. Take your medicines exactly as prescribed. Call your doctor if you think you are having a problem with your medicine. · Practice healthy thinking. How you think can affect how you feel and act. Ask yourself if your thoughts are helpful or unhelpful. If they are unhelpful, you can learn how to change them. · Recognize and accept your anxiety. When you feel anxious, say to yourself, \"This is not an emergency. I feel uncomfortable, but I am not in danger. I can keep going even if I feel anxious. \"  When should you call for help? Call 911  anytime you think you may need emergency care. For example, call if:    · You feel you can't stop from hurting yourself or someone else. Keep the numbers for these national suicide hotlines: 5-306-809-TALK (1-879.124.5466) and 2-289-GIHPBXB (8-275.771.1940).  If you or someone you know talks about suicide or feeling hopeless, get help right away. Call your doctor or counselor now or seek immediate medical care if:    · You have new anxiety, or your anxiety gets worse.     · You have been feeling sad, depressed, or hopeless or have lost interest in things that you usually enjoy.     · You do not get better as expected. Where can you learn more? Go to http://www.gray.com/  Enter G123 in the search box to learn more about \"Generalized Anxiety Disorder: Care Instructions. \"  Current as of: June 16, 2021               Content Version: 13.0  © 5037-0406 [employer]. Care instructions adapted under license by [employer] (which disclaims liability or warranty for this information). If you have questions about a medical condition or this instruction, always ask your healthcare professional. [place] [address] any warranty or liability for your use of this information.     Psychiatric Information: (Very important that you return call to schedule)    [facility] contact information is [phone]

## 2022-02-11 NOTE — PROGRESS NOTES
Chief Complaint   Patient presents with    Anxiety    Asthma    Depression    Elbow Pain     c/o elbow pain along with sharp shooting pain (ongoing past 2 weeks)      1. \"Have you been to the ER, urgent care clinic since your last visit? Hospitalized since your last visit? \" No    2. \"Have you seen or consulted any other health care providers outside of the [address] [place] [address] since your last visit? \" No     3. For patients aged 39-70: Has the patient had a colonoscopy / FIT/ Cologuard? NA - based on age      If the patient is female:    4. For patients aged 41-77: Has the patient had a mammogram within the past 2 years? NA - based on age or sex      11. For patients aged 21-65: Has the patient had a pap smear?  No    Health Maintenance Due   Topic Date Due    Hepatitis C Screening  Never done    Pneumococcal 0-64 years (1 of 2 - PPSV23) Never done    Pap Smear  Never done    COVID-19 Vaccine (2 - Booster for PANOSOL series) 06/05/2021    Flu Vaccine (1) Never done

## 2022-03-19 PROBLEM — J45.909 ASTHMA: Status: ACTIVE | Noted: 2020-02-25

## 2022-03-20 PROBLEM — J96.01 ACUTE RESPIRATORY FAILURE WITH HYPOXEMIA (HCC): Status: ACTIVE | Noted: 2020-02-25

## 2022-03-20 PROBLEM — F41.9 ANXIETY AND DEPRESSION: Status: ACTIVE | Noted: 2022-01-11

## 2022-03-20 PROBLEM — F32.A ANXIETY AND DEPRESSION: Status: ACTIVE | Noted: 2022-01-11

## 2022-12-08 ENCOUNTER — HOSPITAL ENCOUNTER (INPATIENT)
Age: 27
LOS: 2 days | Discharge: HOME | End: 2022-12-10
Attending: EMERGENCY MEDICINE | Admitting: INTERNAL MEDICINE
Payer: COMMERCIAL

## 2022-12-08 DIAGNOSIS — D69.3 ACUTE ITP (HCC): Primary | ICD-10-CM

## 2022-12-08 PROBLEM — J45.909 ASTHMA: Chronic | Status: ACTIVE | Noted: 2020-02-25

## 2022-12-08 LAB
ABO + RH BLD: NORMAL
B PERT DNA SPEC QL NAA+PROBE: NOT DETECTED
BASOPHILS # BLD: 0 K/UL (ref 0–0.1)
BASOPHILS NFR BLD: 0 % (ref 0–2)
BLOOD GROUP ANTIBODIES SERPL: NORMAL
BORDETELLA PARAPERTUSSIS PCR, BORPAR: NOT DETECTED
C PNEUM DNA SPEC QL NAA+PROBE: NOT DETECTED
DIFFERENTIAL METHOD BLD: ABNORMAL
EOSINOPHIL # BLD: 0.1 K/UL (ref 0–0.4)
EOSINOPHIL NFR BLD: 1 % (ref 0–5)
ERYTHROCYTE [DISTWIDTH] IN BLOOD BY AUTOMATED COUNT: 14 % (ref 11.6–14.5)
FLUAV H1 2009 PAND RNA SPEC QL NAA+PROBE: NOT DETECTED
FLUAV H1 RNA SPEC QL NAA+PROBE: NOT DETECTED
FLUAV H3 RNA SPEC QL NAA+PROBE: NOT DETECTED
FLUAV SUBTYP SPEC NAA+PROBE: NOT DETECTED
FLUBV RNA SPEC QL NAA+PROBE: NOT DETECTED
HADV DNA SPEC QL NAA+PROBE: NOT DETECTED
HCG SERPL QL: NEGATIVE
HCOV 229E RNA SPEC QL NAA+PROBE: NOT DETECTED
HCOV HKU1 RNA SPEC QL NAA+PROBE: NOT DETECTED
HCOV NL63 RNA SPEC QL NAA+PROBE: NOT DETECTED
HCOV OC43 RNA SPEC QL NAA+PROBE: NOT DETECTED
HCT VFR BLD AUTO: 40 % (ref 35–45)
HGB BLD-MCNC: 12.8 G/DL (ref 12–16)
HMPV RNA SPEC QL NAA+PROBE: NOT DETECTED
HPIV1 RNA SPEC QL NAA+PROBE: NOT DETECTED
HPIV2 RNA SPEC QL NAA+PROBE: NOT DETECTED
HPIV3 RNA SPEC QL NAA+PROBE: NOT DETECTED
HPIV4 RNA SPEC QL NAA+PROBE: NOT DETECTED
IMM GRANULOCYTES # BLD AUTO: 0 K/UL (ref 0–0.04)
IMM GRANULOCYTES NFR BLD AUTO: 0 % (ref 0–0.5)
INR PPP: 1 (ref 0.8–1.2)
LYMPHOCYTES # BLD: 1.3 K/UL (ref 0.9–3.6)
LYMPHOCYTES NFR BLD: 27 % (ref 21–52)
M PNEUMO DNA SPEC QL NAA+PROBE: NOT DETECTED
MCH RBC QN AUTO: 24.8 PG (ref 24–34)
MCHC RBC AUTO-ENTMCNC: 32 G/DL (ref 31–37)
MCV RBC AUTO: 77.5 FL (ref 78–100)
MONOCYTES # BLD: 0.4 K/UL (ref 0.05–1.2)
MONOCYTES NFR BLD: 8 % (ref 3–10)
NEUTS SEG # BLD: 3.2 K/UL (ref 1.8–8)
NEUTS SEG NFR BLD: 63 % (ref 40–73)
NRBC # BLD: 0 K/UL (ref 0–0.01)
NRBC BLD-RTO: 0 PER 100 WBC
PLATELET # BLD AUTO: 10 K/UL (ref 135–420)
PROTHROMBIN TIME: 13.1 SEC (ref 11.5–15.2)
RBC # BLD AUTO: 5.16 M/UL (ref 4.2–5.3)
RSV RNA SPEC QL NAA+PROBE: NOT DETECTED
RV+EV RNA SPEC QL NAA+PROBE: NOT DETECTED
SARS-COV-2 PCR, COVPCR: NOT DETECTED
SPECIMEN EXP DATE BLD: NORMAL
WBC # BLD AUTO: 5 K/UL (ref 4.6–13.2)

## 2022-12-08 PROCEDURE — G0378 HOSPITAL OBSERVATION PER HR: HCPCS

## 2022-12-08 PROCEDURE — APPSS180 APP SPLIT SHARED TIME > 60 MINUTES

## 2022-12-08 PROCEDURE — 85610 PROTHROMBIN TIME: CPT

## 2022-12-08 PROCEDURE — 65270000029 HC RM PRIVATE

## 2022-12-08 PROCEDURE — 0202U NFCT DS 22 TRGT SARS-COV-2: CPT

## 2022-12-08 PROCEDURE — 84703 CHORIONIC GONADOTROPIN ASSAY: CPT

## 2022-12-08 PROCEDURE — 85025 COMPLETE CBC W/AUTO DIFF WBC: CPT

## 2022-12-08 PROCEDURE — 86900 BLOOD TYPING SEROLOGIC ABO: CPT

## 2022-12-08 PROCEDURE — 99285 EMERGENCY DEPT VISIT HI MDM: CPT

## 2022-12-08 PROCEDURE — 94640 AIRWAY INHALATION TREATMENT: CPT

## 2022-12-08 PROCEDURE — 74011000250 HC RX REV CODE- 250

## 2022-12-08 PROCEDURE — 74011250637 HC RX REV CODE- 250/637

## 2022-12-08 PROCEDURE — 74011250636 HC RX REV CODE- 250/636: Performed by: PHYSICIAN ASSISTANT

## 2022-12-08 PROCEDURE — 96374 THER/PROPH/DIAG INJ IV PUSH: CPT

## 2022-12-08 RX ORDER — SODIUM CHLORIDE 0.9 % (FLUSH) 0.9 %
5-40 SYRINGE (ML) INJECTION AS NEEDED
Status: DISCONTINUED | OUTPATIENT
Start: 2022-12-08 | End: 2022-12-10 | Stop reason: HOSPADM

## 2022-12-08 RX ORDER — FAMOTIDINE 20 MG/1
20 TABLET, FILM COATED ORAL 2 TIMES DAILY
Status: DISCONTINUED | OUTPATIENT
Start: 2022-12-08 | End: 2022-12-10 | Stop reason: HOSPADM

## 2022-12-08 RX ORDER — SODIUM CHLORIDE 0.9 % (FLUSH) 0.9 %
5-40 SYRINGE (ML) INJECTION EVERY 8 HOURS
Status: DISCONTINUED | OUTPATIENT
Start: 2022-12-08 | End: 2022-12-10 | Stop reason: HOSPADM

## 2022-12-08 RX ORDER — IPRATROPIUM BROMIDE AND ALBUTEROL SULFATE 2.5; .5 MG/3ML; MG/3ML
3 SOLUTION RESPIRATORY (INHALATION)
Status: DISCONTINUED | OUTPATIENT
Start: 2022-12-08 | End: 2022-12-10 | Stop reason: HOSPADM

## 2022-12-08 RX ORDER — ONDANSETRON 8 MG/1
4 TABLET, ORALLY DISINTEGRATING ORAL
Status: DISCONTINUED | OUTPATIENT
Start: 2022-12-08 | End: 2022-12-10 | Stop reason: HOSPADM

## 2022-12-08 RX ORDER — DIPHENHYDRAMINE HYDROCHLORIDE 50 MG/ML
50 INJECTION, SOLUTION INTRAMUSCULAR; INTRAVENOUS
Status: ACTIVE | OUTPATIENT
Start: 2022-12-08 | End: 2022-12-09

## 2022-12-08 RX ORDER — ONDANSETRON 2 MG/ML
4 INJECTION INTRAMUSCULAR; INTRAVENOUS
Status: DISCONTINUED | OUTPATIENT
Start: 2022-12-08 | End: 2022-12-10 | Stop reason: HOSPADM

## 2022-12-08 RX ORDER — POLYETHYLENE GLYCOL 3350 17 G/17G
17 POWDER, FOR SOLUTION ORAL DAILY PRN
Status: DISCONTINUED | OUTPATIENT
Start: 2022-12-08 | End: 2022-12-10 | Stop reason: HOSPADM

## 2022-12-08 RX ORDER — ACETAMINOPHEN 325 MG/1
650 TABLET ORAL
Status: DISCONTINUED | OUTPATIENT
Start: 2022-12-08 | End: 2022-12-10 | Stop reason: HOSPADM

## 2022-12-08 RX ORDER — HYDROCORTISONE SODIUM SUCCINATE 100 MG/2ML
50 INJECTION, POWDER, FOR SOLUTION INTRAMUSCULAR; INTRAVENOUS ONCE
Status: ACTIVE | OUTPATIENT
Start: 2022-12-08 | End: 2022-12-09

## 2022-12-08 RX ORDER — ACETAMINOPHEN 500 MG
1000 TABLET ORAL
Status: ACTIVE | OUTPATIENT
Start: 2022-12-08 | End: 2022-12-09

## 2022-12-08 RX ORDER — DEXAMETHASONE 4 MG/1
20 TABLET ORAL EVERY 12 HOURS
Status: COMPLETED | OUTPATIENT
Start: 2022-12-08 | End: 2022-12-09

## 2022-12-08 RX ADMIN — DEXAMETHASONE 20 MG: 4 TABLET ORAL at 14:02

## 2022-12-08 RX ADMIN — FAMOTIDINE 20 MG: 20 TABLET ORAL at 17:52

## 2022-12-08 RX ADMIN — ARFORMOTEROL TARTRATE: 15 SOLUTION RESPIRATORY (INHALATION) at 20:24

## 2022-12-08 RX ADMIN — DEXAMETHASONE 20 MG: 4 TABLET ORAL at 20:27

## 2022-12-08 RX ADMIN — IMMUNE GLOBULIN (HUMAN) 50 G: 10 INJECTION INTRAVENOUS; SUBCUTANEOUS at 19:15

## 2022-12-08 RX ADMIN — IMMUNE GLOBULIN (HUMAN) 50 G: 10 INJECTION INTRAVENOUS; SUBCUTANEOUS at 16:20

## 2022-12-08 NOTE — ED TRIAGE NOTES
I had an appointment with my primary care yesterday and today she called and said my platelets were high and suggested I come to the ER

## 2022-12-08 NOTE — ED PROVIDER NOTES
EMERGENCY DEPARTMENT HISTORY AND PHYSICAL EXAM    Date: 12/8/2022  Patient Name: Owen Arce    History of Presenting Illness     Chief Complaint   Patient presents with    Abnormal Lab Results         History Provided By: Patient and mother    Chief Complaint: Abnormal labs  Duration: Yesterday  Timing: N/A  Location: N/A  Quality: N/A  Severity: Moderate  Modifying Factors: None  Associated Symptoms: none       Additional History (Context): Owen Arce is a 28 y.o. female with a history of asthma who presents today for issues as above. Patient reports she was at her primary care doctor's office yesterday where she had some lab work done and was told this morning that she had a high CBC and low platelet count. Patient denies any known history of coagulopathies. Is not on blood thinners. Reports she has had some random bruising recently. Reports her periods are often prolonged. Has never seen a hematologist for any reason. Has never needed a blood transfusion for any reason. PCP: Dinorah López PA-C    Current Facility-Administered Medications   Medication Dose Route Frequency Provider Last Rate Last Admin    dexAMETHasone (DECADRON) tablet 20 mg  20 mg Oral Q12H Jossy Boston PA   20 mg at 12/08/22 1402    diphenhydrAMINE (BENADRYL) injection 50 mg  50 mg IntraVENous NOW Jossy Boston PA        hydrocortisone sod succ (PF) (SOLU-CORTEF) 50 mg in 0.9% sodium chloride 50 mL IVPB  50 mg IntraVENous ONCE Jossy Boston [place]        acetaminophen (TYLENOL) tablet 1,000 mg  1,000 mg Oral NOW Jossy Boston PA         Current Outpatient Medications   Medication Sig Dispense Refill    escitalopram oxalate (LEXAPRO) 10 mg tablet Take 1 Tablet by mouth daily. 30 Tablet 1    budesonide-formoteroL (SYMBICORT) 160-4.5 mcg/actuation HFAA Take 2 Puffs by inhalation two (2) times a day. 10.2 g 0       Past History     Past Medical History:  No past medical history on file.     Past Surgical History:  No past surgical history on file. Family History:  No family history on file. Social History:  Social History     Tobacco Use    Smoking status: Never    Smokeless tobacco: Never   Vaping Use    Vaping Use: Every day    Substances: Nicotine    Devices: Pre-filled or refillable cartridge   Substance Use Topics    Alcohol use: Yes     Comment: Rare    Drug use: Never       Allergies:  No Known Allergies      Review of Systems   Review of Systems   Constitutional:  Negative for chills and fever. HENT:  Negative for congestion, rhinorrhea and sore throat. Respiratory:  Negative for cough and shortness of breath. Cardiovascular:  Negative for chest pain. Gastrointestinal:  Negative for abdominal pain, blood in stool, constipation, diarrhea, nausea and vomiting. Genitourinary:  Negative for dysuria, frequency and hematuria. Musculoskeletal:  Negative for back pain and myalgias. Skin:  Positive for color change. Negative for rash and wound. Neurological:  Negative for dizziness and headaches. All other systems reviewed and are negative. All Other Systems Negative  Physical Exam     Vitals:    12/08/22 1028   BP: (!) 136/107   Pulse: 92   Resp: 19   Temp: 98.1 °F (36.7 °C)   SpO2: 98%   Weight: 93.4 kg (206 lb)     Physical Exam  Vitals and nursing note reviewed. Constitutional:       General: She is not in acute distress. Appearance: She is well-developed. She is not diaphoretic. Comments: Overall well-appearing, no distress pleasant   HENT:      Head: Normocephalic and atraumatic. Eyes:      Conjunctiva/sclera: Conjunctivae normal.   Cardiovascular:      Rate and Rhythm: Normal rate and regular rhythm. Heart sounds: Normal heart sounds. Pulmonary:      Effort: Pulmonary effort is normal. No respiratory distress. Breath sounds: Normal breath sounds. Chest:      Chest wall: No tenderness.    Abdominal:      General: Bowel sounds are normal. There is no distension. Palpations: Abdomen is soft. Tenderness: There is no abdominal tenderness. There is no guarding or rebound. Musculoskeletal:         General: No deformity. Cervical back: Normal range of motion and neck supple. Comments: Bilateral upper extremity bruising noted   Skin:     General: Skin is warm and dry. Neurological:      Mental Status: She is alert and oriented to person, place, and time. Deep Tendon Reflexes: Reflexes are normal and symmetric. Diagnostic Study Results     Labs -     Recent Results (from the past 12 hour(s))   CBC WITH AUTOMATED DIFF    Collection Time: 12/08/22 11:00 AM   Result Value Ref Range    WBC 5.0 4.6 - 13.2 K/uL    RBC 5.16 4.20 - 5.30 M/uL    HGB 12.8 12.0 - 16.0 g/dL    HCT 40.0 35.0 - 45.0 %    MCV 77.5 (L) 78.0 - 100.0 FL    MCH 24.8 24.0 - 34.0 PG    MCHC 32.0 31.0 - 37.0 g/dL    RDW 14.0 11.6 - 14.5 %    PLATELET 10 (LL) 273 - 420 K/uL    NRBC 0.0 0  WBC    ABSOLUTE NRBC 0.00 0.00 - 0.01 K/uL    NEUTROPHILS 63 40 - 73 %    LYMPHOCYTES 27 21 - 52 %    MONOCYTES 8 3 - 10 %    EOSINOPHILS 1 0 - 5 %    BASOPHILS 0 0 - 2 %    IMMATURE GRANULOCYTES 0 0.0 - 0.5 %    ABS. NEUTROPHILS 3.2 1.8 - 8.0 K/UL    ABS. LYMPHOCYTES 1.3 0.9 - 3.6 K/UL    ABS. MONOCYTES 0.4 0.05 - 1.2 K/UL    ABS. EOSINOPHILS 0.1 0.0 - 0.4 K/UL    ABS. BASOPHILS 0.0 0.0 - 0.1 K/UL    ABS. IMM.  GRANS. 0.0 0.00 - 0.04 K/UL    DF AUTOMATED     PROTHROMBIN TIME + INR    Collection Time: 12/08/22 11:00 AM   Result Value Ref Range    Prothrombin time 13.1 11.5 - 15.2 sec    INR 1.0 0.8 - 1.2     TYPE & SCREEN    Collection Time: 12/08/22 11:00 AM   Result Value Ref Range    Crossmatch Expiration 12/11/2022,2359     ABO/Rh(D) Reyne Maxim POSITIVE     Antibody screen NEG    HCG QL SERUM    Collection Time: 12/08/22 11:00 AM   Result Value Ref Range    HCG, Ql. Negative NEG         Radiologic Studies -   No orders to display     CT Results  (Last 48 hours)      None          CXR Results  (Last 48 hours)      None              Medical Decision Making   I am the first provider for this patient. I reviewed the vital signs, available nursing notes, past medical history, past surgical history, family history and social history. Vital Signs-Reviewed the patient's vital signs. Records Reviewed: Nursing Notes and Old Medical Records     Procedures: None   Procedures    Provider Notes (Medical Decision Making):     Differential: Coagulopathy, anemia    Plan: We will order labs, coags and type and screen    ED Course as of 12/08/22 1409   Thu Dec 08, 2022   1153 Platelets are 10. Discussed case with Dr. Hang Curiel. Will consult hematology. Patient agreeable to platelet transfusion. [CS]   9442 Still pending hematology consult    [CS]   9637 Discussed case with Dr. Ruperto Loya  hematology/oncology who advises admission for acute ITP. Patient is agreeable. I also discussed case with Dr. Pop Christopher, hospitalist who agrees. Have consulted pharmacy for IVIG dosing. Have ordered steroids, Benadryl and Tylenol per Dr. Ruperto Loya request. [CS]      ED Course User Index  [CS] ERUM Soler         MED RECONCILIATION:  Current Facility-Administered Medications   Medication Dose Route Frequency    dexAMETHasone (DECADRON) tablet 20 mg  20 mg Oral Q12H    diphenhydrAMINE (BENADRYL) injection 50 mg  50 mg IntraVENous NOW    hydrocortisone sod succ (PF) (SOLU-CORTEF) 50 mg in 0.9% sodium chloride 50 mL IVPB  50 mg IntraVENous ONCE    acetaminophen (TYLENOL) tablet 1,000 mg  1,000 mg Oral NOW     Current Outpatient Medications   Medication Sig    escitalopram oxalate (LEXAPRO) 10 mg tablet Take 1 Tablet by mouth daily. budesonide-formoteroL (SYMBICORT) 160-4.5 mcg/actuation HFAA Take 2 Puffs by inhalation two (2) times a day. Disposition:  Admit   Diagnosis     Clinical Impression:   1.  Acute ITP (HCC)          \"Please note that this dictation was completed with Willie, the computer voice recognition software. Quite often unanticipated grammatical, syntax, homophones, and other interpretive errors are inadvertently transcribed by the computer software. Please disregard these errors. Please excuse any errors that have escaped final proofreading. \"

## 2022-12-08 NOTE — H&P
History and Physical          Subjective     HPI: Cindy Caputo is a 28 y.o. female with a PMHx of who presented to the ED due to concern for low platelet count. Patient had a routine PCP appointment yesterday and had labs drawn. This morning she got a call from the office stating she needed to go to the ER d/t low platelet count. States earlier this year she had a prolonged period of 2.5-3 months, but by the time she got a PCP appointment bleeding had stopped. States she always bruises easily; has not noticed increasing bruising recently. Denies chronic anticoagulation use. Denies chest pain, sob, fever, chills, nvd, cough, abdominal pain, palpitations, urinary symptoms, recent travel anywhere. Denies smoking, drinking and illicit drug use. In the ED, afebrile, 92 HR, 136/107, 19 resp, 98% RA. CBC unremarkable except platelet count of 10. Hcg negative. Type and cross done. Dr Denis Dailey was consulted by the ER. Received decadron in the ER. Home medications reconciled with the patient   Mother at bedside. Aware of plan. PMHx:  Past Medical History:   Diagnosis Date    Asthma        PSurgHx:  Past Surgical History:   Procedure Laterality Date    HX CHOLECYSTECTOMY         SocialHx:  Social History     Socioeconomic History    Marital status: SINGLE   Tobacco Use    Smoking status: Never    Smokeless tobacco: Never   Vaping Use    Vaping Use: Every day    Substances: Nicotine    Devices: Pre-filled or refillable cartridge   Substance and Sexual Activity    Alcohol use: Yes     Comment: Rare    Drug use: Never    Sexual activity: Never       FamilyHx:  Family History   Problem Relation Age of Onset    Diabetes Mother     Hypertension Mother     Hypertension Father     Diabetes Father        Home Medications:  Prior to Admission Medications   Prescriptions Last Dose Informant Patient Reported?  Taking?   budesonide-formoteroL (SYMBICORT) 160-4.5 mcg/actuation HFAA   No No   Sig: Take 2 Puffs by inhalation two (2) times a day. escitalopram oxalate (LEXAPRO) 10 mg tablet   No No   Sig: Take 1 Tablet by mouth daily. Facility-Administered Medications: None       Allergies:  No Known Allergies     Review of Systems:  CONST: no fever or chills, no fatigue  Eyes: No change in vision, no itching or drainage  ENT: No earache, no tinnitus, no sore throat or sinus congestion. PULM: No shortness of breath, no cough or wheeze. CV: no pnd or orthopnea, no CP, no palpitations, no edema  GI: No abdominal pain, no nausea, no vomiting or diarrhea  : No urinary frequency, no urgency, no hesitancy or dysuria. MSK: No joint or muscle pain, no back pain, no neck pain, no recent trauma. INTEG: No rash, no itching, no lesions. ENDO: No polyuria, no polydipsia, no heat or cold intolerance. HEME: No anemia + chronic easy bruising or bleeding. NEURO: No headache, no dizziness, no seizures, no numbness, no tingling or weakness.    PSYCH: No anxiety, no depression      Objective     Physical Exam:  Visit Vitals  BP (!) 136/107 (BP 1 Location: Left upper arm, BP Patient Position: At rest)   Pulse 92   Temp 98.1 °F (36.7 °C)   Resp 19   Wt 93.4 kg (206 lb)   SpO2 98%   BMI 38.92 kg/m²       General: NAD, appears stated age, alert  Skin: warm, dry, no rashes  Eyes: PERRL, sclera is non-icteric  HENT: normocephalic/atraumatic, moist mucus membranes  Respiratory: CTA with no signs of respiratory distress  Cardiovascular: RRR, no m/r/g, no cyanosis or peripheral edema of extremities  GI: soft, non-tender, normal bowel sounds  Neuro: moves all extremities, no focal deficits, normal speech  Psych: appropriate mood and affect, no visual or auditory hallucinations    Laboratory Studies:  Recent Results (from the past 24 hour(s))   CBC WITH AUTOMATED DIFF    Collection Time: 12/08/22 11:00 AM   Result Value Ref Range    WBC 5.0 4.6 - 13.2 K/uL    RBC 5.16 4.20 - 5.30 M/uL    HGB 12.8 12.0 - 16.0 g/dL    HCT 40.0 35.0 - 45.0 %    MCV 77.5 (L) 78.0 - 100.0 FL    MCH 24.8 24.0 - 34.0 PG    MCHC 32.0 31.0 - 37.0 g/dL    RDW 14.0 11.6 - 14.5 %    PLATELET 10 (LL) 213 - 420 K/uL    NRBC 0.0 0  WBC    ABSOLUTE NRBC 0.00 0.00 - 0.01 K/uL    NEUTROPHILS 63 40 - 73 %    LYMPHOCYTES 27 21 - 52 %    MONOCYTES 8 3 - 10 %    EOSINOPHILS 1 0 - 5 %    BASOPHILS 0 0 - 2 %    IMMATURE GRANULOCYTES 0 0.0 - 0.5 %    ABS. NEUTROPHILS 3.2 1.8 - 8.0 K/UL    ABS. LYMPHOCYTES 1.3 0.9 - 3.6 K/UL    ABS. MONOCYTES 0.4 0.05 - 1.2 K/UL    ABS. EOSINOPHILS 0.1 0.0 - 0.4 K/UL    ABS. BASOPHILS 0.0 0.0 - 0.1 K/UL    ABS. IMM. GRANS. 0.0 0.00 - 0.04 K/UL    DF AUTOMATED     PROTHROMBIN TIME + INR    Collection Time: 12/08/22 11:00 AM   Result Value Ref Range    Prothrombin time 13.1 11.5 - 15.2 sec    INR 1.0 0.8 - 1.2     TYPE & SCREEN    Collection Time: 12/08/22 11:00 AM   Result Value Ref Range    Crossmatch Expiration 12/11/2022,2359     ABO/Rh(D) Kevin Hidalgo POSITIVE     Antibody screen NEG    HCG QL SERUM    Collection Time: 12/08/22 11:00 AM   Result Value Ref Range    HCG, Ql. Negative NEG         Imaging Reviewed:  No results found. Assessment/Plan     Hospital Problems  Date Reviewed: 2/13/2022            Codes Class Noted POA    Acute ITP [facility]) ICD-10-CM: D69.3  ICD-9-CM: 287.31  12/8/2022 Unknown        Asthma ICD-10-CM: J45.909  ICD-9-CM: 493.90  2/25/2020 Yes         Acute ITP-   - Check HIV, hepatitis panel- verbal consent obtained from the patient   - Avoid platelet transfusion  - IVIG infusion, steroids, benadryl, tylenol- Management per hematology- appreciate assistance     Hx of Asthma-   - Con't symbicort   - Add Duoneb PRN       Anticipated Discharge: 2 days     DVT Prophylaxis:  []Lovenox  []Hep SQ  [x]SCDs  []Coumadin []DOAC  []On Heparin gtt     I have personally reviewed all pertinent labs, films and EKGs that have officially resulted.  I reviewed available electronic documentation outlining the initial presentation as well as the emergency room physician's encounter.     Time spent reviewing records, independently interpreting results, obtaining history from patient or caregiver, performing physical exam, ordering tests and medications, communicating with specialists, documenting in the chart, and coordinating overall care is  >40 minutes     LEW SaxenaP-C  [address] [facility]  Office:  [phone]

## 2022-12-08 NOTE — Clinical Note
Status[de-identified] INPATIENT [101]   Type of Bed: Stepdown [17]   Cardiac Monitoring Required?: Yes   Inpatient Hospitalization Certified Necessary for the Following Reasons: 3.  Patient receiving treatment that can only be provided in an inpatient setting (further clarification in H&P documentation)   Admitting Diagnosis: Acute ITP [facility]) [[id number]]   Admitting Physician: Desiree Sepulveda   Attending Physician: Desiree Sepulveda   Estimated Length of Stay: 3-4 Midnights   Discharge Plan[de-identified] Home with Office Follow-up

## 2022-12-09 LAB
ANION GAP SERPL CALC-SCNC: 9 MMOL/L (ref 3–18)
BASOPHILS # BLD: 0 K/UL (ref 0–0.1)
BASOPHILS NFR BLD: 0 % (ref 0–2)
BUN SERPL-MCNC: 9 MG/DL (ref 7–18)
BUN/CREAT SERPL: 8 (ref 12–20)
CALCIUM SERPL-MCNC: 8.9 MG/DL (ref 8.5–10.1)
CHLORIDE SERPL-SCNC: 105 MMOL/L (ref 100–111)
CO2 SERPL-SCNC: 23 MMOL/L (ref 21–32)
CREAT SERPL-MCNC: 1.07 MG/DL (ref 0.6–1.3)
DIFFERENTIAL METHOD BLD: ABNORMAL
EOSINOPHIL # BLD: 0 K/UL (ref 0–0.4)
EOSINOPHIL NFR BLD: 0 % (ref 0–5)
ERYTHROCYTE [DISTWIDTH] IN BLOOD BY AUTOMATED COUNT: 14 % (ref 11.6–14.5)
GLUCOSE SERPL-MCNC: 231 MG/DL (ref 74–99)
HCT VFR BLD AUTO: 35.6 % (ref 35–45)
HCV AB SER IA-ACNC: 0.17 INDEX
HCV AB SERPL QL IA: NEGATIVE
HCV COMMENT,HCGAC: NORMAL
HGB BLD-MCNC: 11.3 G/DL (ref 12–16)
HIV 1+2 AB+HIV1 P24 AG SERPL QL IA: NONREACTIVE
HIV12 RESULT COMMENT, HHIVC: NORMAL
IMM GRANULOCYTES # BLD AUTO: 0.1 K/UL (ref 0–0.04)
IMM GRANULOCYTES NFR BLD AUTO: 0 % (ref 0–0.5)
LYMPHOCYTES # BLD: 0.4 K/UL (ref 0.9–3.6)
LYMPHOCYTES NFR BLD: 3 % (ref 21–52)
MCH RBC QN AUTO: 25 PG (ref 24–34)
MCHC RBC AUTO-ENTMCNC: 31.7 G/DL (ref 31–37)
MCV RBC AUTO: 78.8 FL (ref 78–100)
MONOCYTES # BLD: 0.2 K/UL (ref 0.05–1.2)
MONOCYTES NFR BLD: 1 % (ref 3–10)
NEUTS SEG # BLD: 12.2 K/UL (ref 1.8–8)
NEUTS SEG NFR BLD: 95 % (ref 40–73)
NRBC # BLD: 0 K/UL (ref 0–0.01)
NRBC BLD-RTO: 0 PER 100 WBC
PLATELET # BLD AUTO: 68 K/UL (ref 135–420)
PMV BLD AUTO: 12.9 FL (ref 9.2–11.8)
POTASSIUM SERPL-SCNC: 3.6 MMOL/L (ref 3.5–5.5)
RBC # BLD AUTO: 4.52 M/UL (ref 4.2–5.3)
SODIUM SERPL-SCNC: 137 MMOL/L (ref 136–145)
WBC # BLD AUTO: 12.9 K/UL (ref 4.6–13.2)

## 2022-12-09 PROCEDURE — G0378 HOSPITAL OBSERVATION PER HR: HCPCS

## 2022-12-09 PROCEDURE — 87338 HPYLORI STOOL AG IA: CPT

## 2022-12-09 PROCEDURE — 74011250637 HC RX REV CODE- 250/637

## 2022-12-09 PROCEDURE — 74011250636 HC RX REV CODE- 250/636: Performed by: PHYSICIAN ASSISTANT

## 2022-12-09 PROCEDURE — 74011250637 HC RX REV CODE- 250/637: Performed by: INTERNAL MEDICINE

## 2022-12-09 PROCEDURE — 80074 ACUTE HEPATITIS PANEL: CPT

## 2022-12-09 PROCEDURE — 99232 SBSQ HOSP IP/OBS MODERATE 35: CPT | Performed by: GENERAL PRACTICE

## 2022-12-09 PROCEDURE — 94761 N-INVAS EAR/PLS OXIMETRY MLT: CPT

## 2022-12-09 PROCEDURE — 86677 HELICOBACTER PYLORI ANTIBODY: CPT

## 2022-12-09 PROCEDURE — 94640 AIRWAY INHALATION TREATMENT: CPT

## 2022-12-09 PROCEDURE — 87389 HIV-1 AG W/HIV-1&-2 AB AG IA: CPT

## 2022-12-09 PROCEDURE — 86038 ANTINUCLEAR ANTIBODIES: CPT

## 2022-12-09 PROCEDURE — 74011250636 HC RX REV CODE- 250/636: Performed by: INTERNAL MEDICINE

## 2022-12-09 PROCEDURE — 74011000250 HC RX REV CODE- 250

## 2022-12-09 PROCEDURE — 85025 COMPLETE CBC W/AUTO DIFF WBC: CPT

## 2022-12-09 PROCEDURE — 96376 TX/PRO/DX INJ SAME DRUG ADON: CPT

## 2022-12-09 PROCEDURE — 86803 HEPATITIS C AB TEST: CPT

## 2022-12-09 PROCEDURE — 96375 TX/PRO/DX INJ NEW DRUG ADDON: CPT

## 2022-12-09 PROCEDURE — 65660000004 HC RM CVT STEPDOWN

## 2022-12-09 PROCEDURE — 80048 BASIC METABOLIC PNL TOTAL CA: CPT

## 2022-12-09 RX ORDER — DIPHENHYDRAMINE HCL 25 MG
25 CAPSULE ORAL ONCE
Status: COMPLETED | OUTPATIENT
Start: 2022-12-09 | End: 2022-12-09

## 2022-12-09 RX ORDER — HYDROCORTISONE SODIUM SUCCINATE 100 MG/2ML
50 INJECTION, POWDER, FOR SOLUTION INTRAMUSCULAR; INTRAVENOUS ONCE
Status: COMPLETED | OUTPATIENT
Start: 2022-12-09 | End: 2022-12-09

## 2022-12-09 RX ORDER — ACETAMINOPHEN 325 MG/1
650 TABLET ORAL ONCE
Status: COMPLETED | OUTPATIENT
Start: 2022-12-09 | End: 2022-12-09

## 2022-12-09 RX ADMIN — SODIUM CHLORIDE, PRESERVATIVE FREE 10 ML: 5 INJECTION INTRAVENOUS at 17:23

## 2022-12-09 RX ADMIN — DEXAMETHASONE 20 MG: 4 TABLET ORAL at 10:12

## 2022-12-09 RX ADMIN — SODIUM CHLORIDE, PRESERVATIVE FREE 10 ML: 5 INJECTION INTRAVENOUS at 22:16

## 2022-12-09 RX ADMIN — IMMUNE GLOBULIN (HUMAN) 50 G: 10 INJECTION INTRAVENOUS; SUBCUTANEOUS at 11:25

## 2022-12-09 RX ADMIN — ARFORMOTEROL TARTRATE: 15 SOLUTION RESPIRATORY (INHALATION) at 19:19

## 2022-12-09 RX ADMIN — DEXAMETHASONE 20 MG: 4 TABLET ORAL at 22:17

## 2022-12-09 RX ADMIN — IMMUNE GLOBULIN (HUMAN) 50 G: 10 INJECTION INTRAVENOUS; SUBCUTANEOUS at 14:24

## 2022-12-09 RX ADMIN — FAMOTIDINE 20 MG: 20 TABLET ORAL at 17:24

## 2022-12-09 RX ADMIN — DIPHENHYDRAMINE HYDROCHLORIDE 25 MG: 25 CAPSULE ORAL at 10:12

## 2022-12-09 RX ADMIN — ACETAMINOPHEN 650 MG: 325 TABLET, FILM COATED ORAL at 10:13

## 2022-12-09 RX ADMIN — HYDROCORTISONE SODIUM SUCCINATE 50 MG: 100 INJECTION, POWDER, FOR SOLUTION INTRAMUSCULAR; INTRAVENOUS at 10:11

## 2022-12-09 RX ADMIN — ARFORMOTEROL TARTRATE: 15 SOLUTION RESPIRATORY (INHALATION) at 10:11

## 2022-12-09 RX ADMIN — FAMOTIDINE 20 MG: 20 TABLET ORAL at 10:13

## 2022-12-09 NOTE — PROGRESS NOTES
TRANSFER - IN REPORT:    Verbal report received from Nino Arnett RN(name) on Ivan Frost  being received from ED(unit) for routine progression of care      Report consisted of patients Situation, Background, Assessment and   Recommendations(SBAR). Information from the following report(s) SBAR, Kardex, Intake/Output, MAR, and Cardiac Rhythm NSR  was reviewed with the receiving nurse. Opportunity for questions and clarification was provided. Assessment completed upon patients arrival to unit and care assumed. 1520- Patient arrives on unit. VS taken, patient resting in bed with mom at bedside.

## 2022-12-09 NOTE — CONSULTS
Pt interviewed and examined. Dx ac ITP  Dex 20mg bid x 4 days  IVIG 1 gmx kg , yesterday and today. D/w pt and mother.     Note dictated

## 2022-12-09 NOTE — CONSULTS
[address] [facility]    Name:  Nirav Benoit  MR#:   [id number]  :  1995  ACCOUNT #:  [de-identified]  DATE OF SERVICE:  2022    REFERRING PHYSICIAN:  Dr. Donald Settler. REASON FOR EVALUATION:  Acute thrombocytopenia. HISTORY OF PRESENT ILLNESS:  The patient is a young woman, 28years old, who noted petechiae a day ago. She had CBC done at the primary care physician's office and was asked to come into the emergency room after a note of a low platelet count. Her baseline platelets in  were 297,000. Yesterday in the ER, her platelets were 77,258. Her hemoglobin is 12.8. White count is 5000. At the present time, she does not report any gum bleeding or epistaxis. In the past, she has had menorrhalgia. She has never had previous episodes of thrombocytopenia. She does not report any prior infections, sore throat, fever in the last few days. There has been no exposure to any individual with infection. She overall feels well. Her mother is at her bedside. Her respiratory virus titers are negative for RSV and influenza. The patient has been started on dexamethasone 20 mg twice a day since yesterday. She has been dosed with IV gammaglobulin 1 g/kg body weight. She has tolerated the treatment. This morning, she is awake, alert, overall feeling well. There is no active bleeding noted. PAST MEDICAL HISTORY:  Obesity, asthma. PREVIOUS SURGICAL HISTORY:  Cholecystectomy. SOCIAL HISTORY:  Single. She has no tobacco use. She denies IV drug use. She has history of vaping. FAMILY HISTORY:  Noncontributory. REVIEW OF SYSTEMS:  No chest pain, cough, shortness of breath. Denies nausea, vomiting, change in bowel habits. No sore throat or sinus congestion. Denies fever. No rash or jaundice. Appropriate mood and affect. Petechiae noted on the skin 2 days ago. No gum bleeding or epistaxis.     PHYSICAL EXAMINATION:  GENERAL:  Very pleasant young woman, obese, hemodynamically stable. HEENT:  Pupils equal.  Sclerae anicteric. Oropharynx without mucositis. No buccal ecchymosis. Tiny petechiae noted on the forearms. EXTREMITIES:  Without edema. LUNGS:  Clear to percussion and auscultation. CARDIAC:  First and second heart sounds audible. ABDOMEN:  Soft. No organomegaly. NEUROLOGIC ASSESSMENT:  Alert and oriented. No focal motor deficit. LABORATORY DATA:  Hemoglobin 12.8, white count 5000, platelets 53,100. BUN 7, creatinine 0.8. LFTs within normal limits. Pregnancy test negative. IMPRESSION:  1. 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 2.  No constitutional symptoms. 3.  Normal hemoglobin and white count. RECOMMENDATIONS:  I have suggested completing a baseline viral screen that includes hep C and HIV. Dexamethasone 20 mg twice a day for 4 days p.o.  GI prophylaxis to avoid gastritis. I have also recommended IV gammaglobulin 1 g/kg for two doses. She will be dosed with her second dose today along with the premedications to prevent hypersensitivity reaction. Should the platelet count trend up, she possibly will be followed up outpatient and will be discharged home. Care plan and diagnostic workup discussed with the patient and the mother. Thank you for requesting hematology evaluation in the patient's care.       Rich Hamman, MD      [id number]  D:  12/09/2022 9:37  T:  12/09/2022 13:57  JOB #:  [id number]  CC:  Lilli Johnson MD

## 2022-12-09 NOTE — MED STUDENT NOTES
[facility] Hospitalist Group  Progress Note    Patient: Chandrakant Quinonez Age: 28 y.o. : 1995 MR#: [id number] SSN: [id number]  Date/Time: 2022     Subjective:     HPI: 28year old female with hx significant for dysmenorrhea, asthma, GB removal, vesicoureteral reflux stage IV, and obesity presents to ED as per [facility] after abnormal CBC found severe thrombocytopenia on an intake visit. Patient states the only blood related condition she has had in recent memory is when her menstrual cycle lasted for a continuous 3 month-period (January-March), but it has since returned to a regular cycle with intermittent spotting. Reports feeling more fatigued lately but assumed it was depression. Reports a new rash that began about 2 weeks ago on UE/LE that she describes as red bumps that look like it could be acne. Reports easy bruising, denies having any recent cuts/scrapes that led to prolonged bleeding. Denies racing heart or chest pain, denies feelings of dizziness, no fainting spells. No sick contacts, no stiff neck pain or recent illness. FH: Polycythemia vera, maternal aunt. HCV, maternal uncle. PMH: frequent hospitalizations due to GB pain prior to cholecystectomy. Broken clavicle, right side, 2x ages 14 & 11. Reports rapid weight gain around age 14. 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 Cholecystectomy due to biliary sludge on HIDA scan, 5530, no complications    Medications: maintenance inhaler as needed; takes tylenol as needed for intermittent stomach pain    Allergies: no allergies to medication; seasonal allergies trigger asthma symptoms. Social: Not sexually active. Does not follow special diet. Up to date on all vaccines including COVID booster and recent flu shot (within last month).   Denies IV or other illicit drug use    ROS  HEENT: +sensitivity to bright light  GI: no bowel changes to include BM frequency, caliber, color, consistency. : no polyuria, dysuria, changes in urine color  Neuro: no numbness/tingling in extremities  Resp: no trouble breathing, coughing, wheezing      Assessment/Plan:     Severe thrombocytopenia - consider idiopathic primary ITP, possibly secondary ITP associated with undiagnosed autoimmune disorder or infection. Unlikely due to malignancy based on age and symptoms. Recommend hematology consult, repeat CBC with peripheral smear to assess cell morphology. Recommend test for HCV due to family contact. Hirsutism - consider undiagnosed PCOS due to dysmenorrhea history, spotting between menses. Recommend testing for serum testosterone levels and pelvic U/S   Asthma: continue maintenance inhaler as needed  Dentition: patient should see a dentist                Miguel Delatorre  12/09/22      Case discussed with:  []Patient  []Family  []Nursing  []Case Management  DVT Prophylaxis:  []Lovenox  []Hep SQ  []SCDs  []Coumadin   []On Heparin gtt    Objective:   VS: Visit Vitals  /68   Pulse 85   Temp 98.1 °F (36.7 °C)   Resp 19   Wt 206 lb (93.4 kg)   SpO2 97%   BMI 38.92 kg/m²        Tmax/24hrs: No data recorded. IOBRIEFNo intake or output data in the 24 hours ending 12/09/22 1301    General:  Alert, cooperative, no acute distress;   Skin: significant pallor, bilateral petechial rash and multiple small bruises scattered on UE/LE. HEENT: moist mucous membranes, very poor dentition, no petechiae on tongue or pharynx; no erythema  Pulmonary:  CTA Bilaterally. No Wheezing/Rhonchi/Rales. Cardiovascular: Slight tachycardia, normal rhythm without M/R/G   GI:  no guarding or rebound with palpation; unable to confirm hepatosplenomegaly due to patient's size  Extremities:  No edema, cyanosis, clubbing. No calf tenderness. Neurologic: Alert and oriented X 3. No acute neuro deficits. Additional: has significant hirsutism on upper lip and chin.     Medications:   Current Facility-Administered Medications   Medication Dose Route Frequency    immune globulin 10% infusion 50 g  50 g IntraVENous ONCE TITR    dexAMETHasone (DECADRON) tablet 20 mg  20 mg Oral Q12H    arformoterol 15 mcg/budesonide 0.5 mg neb solution   Nebulization BID RT    sodium chloride (NS) flush 5-40 mL  5-40 mL IntraVENous Q8H    sodium chloride (NS) flush 5-40 mL  5-40 mL IntraVENous PRN    acetaminophen (TYLENOL) tablet 650 mg  650 mg Oral Q6H PRN    Or    acetaminophen (TYLENOL) suppository 650 mg  650 mg Rectal Q6H PRN    polyethylene glycol (MIRALAX) packet 17 g  17 g Oral DAILY PRN    ondansetron (ZOFRAN ODT) tablet 4 mg  4 mg Oral Q8H PRN    Or    ondansetron (ZOFRAN) injection 4 mg  4 mg IntraVENous Q6H PRN    famotidine (PEPCID) tablet 20 mg  20 mg Oral BID    albuterol-ipratropium (DUO-NEB) 2.5 MG-0.5 MG/3 ML  3 mL Nebulization Q4H PRN     Current Outpatient Medications   Medication Sig    escitalopram oxalate (LEXAPRO) 10 mg tablet Take 1 Tablet by mouth daily. (Patient not taking: Reported on 12/8/2022)    budesonide-formoteroL (SYMBICORT) 160-4.5 mcg/actuation HFAA Take 2 Puffs by inhalation two (2) times a day. Imaging:   XR Results (most recent):  No results found for this or any previous visit. CT Results (most recent):  No results found for this or any previous visit. MRI Results (most recent):  No results found for this or any previous visit.         Labs:    Recent Results (from the past 48 hour(s))   CBC WITH AUTOMATED DIFF    Collection Time: 12/08/22 11:00 AM   Result Value Ref Range    WBC 5.0 4.6 - 13.2 K/uL    RBC 5.16 4.20 - 5.30 M/uL    HGB 12.8 12.0 - 16.0 g/dL    HCT 40.0 35.0 - 45.0 %    MCV 77.5 (L) 78.0 - 100.0 FL    MCH 24.8 24.0 - 34.0 PG    MCHC 32.0 31.0 - 37.0 g/dL    RDW 14.0 11.6 - 14.5 %    PLATELET 10 (LL) 560 - 420 K/uL    NRBC 0.0 0  WBC    ABSOLUTE NRBC 0.00 0.00 - 0.01 K/uL    NEUTROPHILS 63 40 - 73 %    LYMPHOCYTES 27 21 - 52 %    MONOCYTES 8 3 - 10 % EOSINOPHILS 1 0 - 5 %    BASOPHILS 0 0 - 2 %    IMMATURE GRANULOCYTES 0 0.0 - 0.5 %    ABS. NEUTROPHILS 3.2 1.8 - 8.0 K/UL    ABS. LYMPHOCYTES 1.3 0.9 - 3.6 K/UL    ABS. MONOCYTES 0.4 0.05 - 1.2 K/UL    ABS. EOSINOPHILS 0.1 0.0 - 0.4 K/UL    ABS. BASOPHILS 0.0 0.0 - 0.1 K/UL    ABS. IMM.  GRANS. 0.0 0.00 - 0.04 K/UL    DF AUTOMATED     PROTHROMBIN TIME + INR    Collection Time: 12/08/22 11:00 AM   Result Value Ref Range    Prothrombin time 13.1 11.5 - 15.2 sec    INR 1.0 0.8 - 1.2     TYPE & SCREEN    Collection Time: 12/08/22 11:00 AM   Result Value Ref Range    Crossmatch Expiration 12/11/2022,2359     ABO/Rh(D) Yoly Lack POSITIVE     Antibody screen NEG    HCG QL SERUM    Collection Time: 12/08/22 11:00 AM   Result Value Ref Range    HCG, Ql. Negative NEG     RESPIRATORY VIRUS PANEL W/COVID-19, PCR    Collection Time: 12/08/22  9:55 PM    Specimen: Nasopharyngeal   Result Value Ref Range    Adenovirus Not detected NOTD      Coronavirus 229E Not detected NOTD      Coronavirus HKU1 Not detected NOTD      Coronavirus CVNL63 Not detected NOTD      Coronavirus OC43 Not detected NOTD      SARS-CoV-2, PCR Not detected NOTD      Metapneumovirus Not detected NOTD      Rhinovirus and Enterovirus Not detected NOTD      Influenza A Not detected NOTD      Influenza A, subtype H1 Not detected NOTD      Influenza A, subtype H3 Not detected NOTD      INFLUENZA A H1N1 PCR Not detected NOTD      Influenza B Not detected NOTD      Parainfluenza 1 Not detected NOTD      Parainfluenza 2 Not detected NOTD      Parainfluenza 3 Not detected NOTD      Parainfluenza virus 4 Not detected NOTD      RSV by PCR Not detected NOTD      B. parapertussis, PCR Not detected NOTD      Bordetella pertussis - PCR Not detected NOTD      Chlamydophila pneumoniae DNA, QL, PCR Not detected NOTD      Mycoplasma pneumoniae DNA, QL, PCR Not detected NOTD         Signed By: Tracey Qiu     December 9, 2022      I spent a lot of minutes with the patient in face-to-face consultation, of which less than 50% was spent in counseling and coordination of care as described above    Disclaimer: Sections of this note are dictated using utilizing voice recognition software. Minor typographical errors may be present. If questions arise, please do not hesitate to contact me or call our department. *ATTENTION:  This note has been created by a medical student for educational purposes only. Please do not refer to the content of this note for clinical decision-making, billing, or other purposes. Please see attending physicians note to obtain clinical information on this patient. *

## 2022-12-09 NOTE — PROGRESS NOTES
[facility] Hospitalist Group  Progress Note    Patient: Javier Panda Age: 28 y.o. : 1995 MR#: [id number] SSN: [id number]  Date: 2022     Subjective:     Pt feels fine. No prior sexual encounters. No IV drug use. Lives with her grandfather as primary care taker who had HEP C, denies needle stick. Three month bleeding with period that started in January and ended in March. Reports always easy bruising. Rapid weight gain, concurrent with the onset of her periods. Endorses onset of hirsutism around the same time. Endorses photophobia and headaches that have onset this year as well. Denies weight loss, diarrhea, hematochezia, hematuria. Endorses diffuse abd pain, worse in the epigastrium, and on an empty stomach. Assessment/Plan:   1. 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 Workup in progress. Rule out secondary causes; HIV, HCV, HBV, NAGI, TSH, H pylori. Based on symptoms, leading cause is H. Pylori vs. Primary, idiopathic. Continue steroids and IVIG for 2 days. Appreciate onc recs. 2. Obesity: onset concurrent with periods, hirsutism. PCOS vs HPA axis disorder. No violaceous striae on exam. No reported US to confirm suspect PCOS. Recommend outpatient workup. Additional Notes:      Case discussed with:  [x]Patient  []Family  []Nursing  []Case Management  DVT Prophylaxis:  []Lovenox  []Hep SQ  []SCDs  []Coumadin   []On Heparin gtt    Objective:   VS: Visit Vitals  /68   Pulse 85   Temp 98.1 °F (36.7 °C)   Resp 19   Wt 93.4 kg (206 lb)   SpO2 97%   BMI 38.92 kg/m²      Tmax/24hrs: No data recorded. No intake or output data in the 24 hours ending 22 1201    General:  Alert, NAD, poor dentition. No petechial rash in oropharynx. Cardiovascular:  RRR  Pulmonary:  LSC throughout; respiratory effort WNL  GI:  +BS in all four quadrants, soft, TTP diffusely, worse in epigastrium. Extremities:  No edema; 2+ dorsalis pedis pulses bilaterally  Neuro: CN II-XII intact.   Skin: scattered petechiae of RUE.        Labs:    Recent Results (from the past 24 hour(s))   RESPIRATORY VIRUS PANEL W/COVID-19, PCR    Collection Time: 12/08/22  9:55 PM    Specimen: Nasopharyngeal   Result Value Ref Range    Adenovirus Not detected NOTD      Coronavirus 229E Not detected NOTD      Coronavirus HKU1 Not detected NOTD      Coronavirus CVNL63 Not detected NOTD      Coronavirus OC43 Not detected NOTD      SARS-CoV-2, PCR Not detected NOTD      Metapneumovirus Not detected NOTD      Rhinovirus and Enterovirus Not detected NOTD      Influenza A Not detected NOTD      Influenza A, subtype H1 Not detected NOTD      Influenza A, subtype H3 Not detected NOTD      INFLUENZA A H1N1 PCR Not detected NOTD      Influenza B Not detected NOTD      Parainfluenza 1 Not detected NOTD      Parainfluenza 2 Not detected NOTD      Parainfluenza 3 Not detected NOTD      Parainfluenza virus 4 Not detected NOTD      RSV by PCR Not detected NOTD      B. parapertussis, PCR Not detected NOTD      Bordetella pertussis - PCR Not detected NOTD      Chlamydophila pneumoniae DNA, QL, PCR Not detected NOTD      Mycoplasma pneumoniae DNA, QL, PCR Not detected NOTD         Signed By: Shannan Enriquez MD     December 9, 2022

## 2022-12-09 NOTE — ED NOTES
TRANSFER - OUT REPORT:    Verbal report given to ABHINAV Severino on Patricia Rios  being transferred to CVT Stepdown for routine progression of care       Report consisted of patients Situation, Background, Assessment and   Recommendations(SBAR). Information from the following report(s) SBAR was reviewed with the receiving nurse. Lines:   Peripheral IV 12/08/22 Posterior;Right Hand (Active)        Opportunity for questions and clarification was provided.       Patient transported with:   Registered Nurse

## 2022-12-09 NOTE — MED STUDENT NOTES
[facility] Hospitalist Group  Progress Note    Patient: Chetan Paige Age: 28 y.o. : 1995 MR#: [id number] SSN: [id number]  Date/Time: 2022     Subjective:     HPI: 28year old female with hx significant for dysmenorrhea, asthma, GB removal, vesicoureteral reflux stage IV, and obesity presents to ED as per [facility] after abnormal CBC found severe thrombocytopenia on an intake visit. Patient states the only blood related condition she has had in recent memory is when her menstrual cycle lasted for a continuous 3 month-period (January-March), but it has since returned to a regular cycle with intermittent spotting. Reports feeling more fatigued lately but assumed it was depression. Reports a new rash that began about 2 weeks ago on UE/LE that she describes as red bumps that look like it could be acne. Reports easy bruising, denies having any recent cuts/scrapes that led to prolonged bleeding. Denies racing heart or chest pain, denies feelings of dizziness, no fainting spells. No sick contacts, no stiff neck pain or recent illness. FH: Polycythemia vera, maternal aunt. HCV, maternal uncle. PMH: frequent hospitalizations due to GB pain prior to cholecystectomy. Broken clavicle, right side, 2x ages 14 & 11. 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 Cholecystectomy due to biliary sludge on HIDA scan, 4592, no complications    Medications: maintenance inhaler as needed; takes tylenol as needed for intermittent stomach pain    Allergies: no allergies to medication; seasonal allergies trigger asthma symptoms. Social: Not sexually active. Does not follow special diet. Up to date on all vaccines including COVID booster and recent flu shot (within last month)    ROS  HEENT: +sensitivity to bright light  GI: no bowel changes to include BM frequency, caliber, color, consistency.     : no polyuria, dysuria, changes in urine color  Neuro: no numbness/tingling in extremities  Resp: no trouble breathing, coughing, wheezing      Assessment/Plan:     Severe thrombocytopenia - consider idiopathic primary ITP, possibly secondary ITP associated with undiagnosed autoimmune disorder or infection. Unlikely due to malignancy based on age and symptoms. Recommend hematology consult, repeat CBC with peripheral smear to assess cell morphology. Recommend test for HCV due to family contact  Hirsutism - consider undiagnosed PCOS due to dysmenorrhea history, spotting between menses. Recommend testing for serum testosterone levels and pelvic U/S   Asthma: continue maintenance inhaler as needed  Dentition: patient should see a dentist                Delon Bhatia  12/09/22      Case discussed with:  []Patient  []Family  []Nursing  []Case Management  DVT Prophylaxis:  []Lovenox  []Hep SQ  []SCDs  []Coumadin   []On Heparin gtt    Objective:   VS: Visit Vitals  /68   Pulse 85   Temp 98.1 °F (36.7 °C)   Resp 19   Wt 206 lb (93.4 kg)   SpO2 97%   BMI 38.92 kg/m²      Tmax/24hrs: No data recorded. IOBRIEFNo intake or output data in the 24 hours ending 12/09/22 1127    General:  Alert, cooperative, no acute distress;   Skin: significant pallor, bilateral petechial rash and multiple small bruises scattered on UE/LE. HEENT: moist mucous membranes, very poor dentition, no petechiae on tongue or pharynx; no erythema  Pulmonary:  CTA Bilaterally. No Wheezing/Rhonchi/Rales. Cardiovascular: Slight tachycardia, normal rhythm without M/R/G   GI:  no guarding or rebound with palpation; unable to confirm hepatosplenomegaly due to patient's size  Extremities:  No edema, cyanosis, clubbing. No calf tenderness. Neurologic: Alert and oriented X 3. No acute neuro deficits. Additional: has significant hirsutism on upper lip and chin.     Medications:   Current Facility-Administered Medications   Medication Dose Route Frequency    immune globulin 10% infusion 50 g 50 g IntraVENous ONCE TITR    dexAMETHasone (DECADRON) tablet 20 mg  20 mg Oral Q12H    arformoterol 15 mcg/budesonide 0.5 mg neb solution   Nebulization BID RT    sodium chloride (NS) flush 5-40 mL  5-40 mL IntraVENous Q8H    sodium chloride (NS) flush 5-40 mL  5-40 mL IntraVENous PRN    acetaminophen (TYLENOL) tablet 650 mg  650 mg Oral Q6H PRN    Or    acetaminophen (TYLENOL) suppository 650 mg  650 mg Rectal Q6H PRN    polyethylene glycol (MIRALAX) packet 17 g  17 g Oral DAILY PRN    ondansetron (ZOFRAN ODT) tablet 4 mg  4 mg Oral Q8H PRN    Or    ondansetron (ZOFRAN) injection 4 mg  4 mg IntraVENous Q6H PRN    famotidine (PEPCID) tablet 20 mg  20 mg Oral BID    albuterol-ipratropium (DUO-NEB) 2.5 MG-0.5 MG/3 ML  3 mL Nebulization Q4H PRN     Current Outpatient Medications   Medication Sig    escitalopram oxalate (LEXAPRO) 10 mg tablet Take 1 Tablet by mouth daily. (Patient not taking: Reported on 12/8/2022)    budesonide-formoteroL (SYMBICORT) 160-4.5 mcg/actuation HFAA Take 2 Puffs by inhalation two (2) times a day. Imaging:   XR Results (most recent):  No results found for this or any previous visit. CT Results (most recent):  No results found for this or any previous visit. MRI Results (most recent):  No results found for this or any previous visit. Labs:    Recent Results (from the past 48 hour(s))   CBC WITH AUTOMATED DIFF    Collection Time: 12/08/22 11:00 AM   Result Value Ref Range    WBC 5.0 4.6 - 13.2 K/uL    RBC 5.16 4.20 - 5.30 M/uL    HGB 12.8 12.0 - 16.0 g/dL    HCT 40.0 35.0 - 45.0 %    MCV 77.5 (L) 78.0 - 100.0 FL    MCH 24.8 24.0 - 34.0 PG    MCHC 32.0 31.0 - 37.0 g/dL    RDW 14.0 11.6 - 14.5 %    PLATELET 10 (LL) 835 - 420 K/uL    NRBC 0.0 0  WBC    ABSOLUTE NRBC 0.00 0.00 - 0.01 K/uL    NEUTROPHILS 63 40 - 73 %    LYMPHOCYTES 27 21 - 52 %    MONOCYTES 8 3 - 10 %    EOSINOPHILS 1 0 - 5 %    BASOPHILS 0 0 - 2 %    IMMATURE GRANULOCYTES 0 0.0 - 0.5 %    ABS. NEUTROPHILS 3.2 1.8 - 8.0 K/UL    ABS. LYMPHOCYTES 1.3 0.9 - 3.6 K/UL    ABS. MONOCYTES 0.4 0.05 - 1.2 K/UL    ABS. EOSINOPHILS 0.1 0.0 - 0.4 K/UL    ABS. BASOPHILS 0.0 0.0 - 0.1 K/UL    ABS. IMM.  GRANS. 0.0 0.00 - 0.04 K/UL    DF AUTOMATED     PROTHROMBIN TIME + INR    Collection Time: 12/08/22 11:00 AM   Result Value Ref Range    Prothrombin time 13.1 11.5 - 15.2 sec    INR 1.0 0.8 - 1.2     TYPE & SCREEN    Collection Time: 12/08/22 11:00 AM   Result Value Ref Range    Crossmatch Expiration 12/11/2022,2359     ABO/Rh(D) Sharol Mcburney POSITIVE     Antibody screen NEG    HCG QL SERUM    Collection Time: 12/08/22 11:00 AM   Result Value Ref Range    HCG, Ql. Negative NEG     RESPIRATORY VIRUS PANEL W/COVID-19, PCR    Collection Time: 12/08/22  9:55 PM    Specimen: Nasopharyngeal   Result Value Ref Range    Adenovirus Not detected NOTD      Coronavirus 229E Not detected NOTD      Coronavirus HKU1 Not detected NOTD      Coronavirus CVNL63 Not detected NOTD      Coronavirus OC43 Not detected NOTD      SARS-CoV-2, PCR Not detected NOTD      Metapneumovirus Not detected NOTD      Rhinovirus and Enterovirus Not detected NOTD      Influenza A Not detected NOTD      Influenza A, subtype H1 Not detected NOTD      Influenza A, subtype H3 Not detected NOTD      INFLUENZA A H1N1 PCR Not detected NOTD      Influenza B Not detected NOTD      Parainfluenza 1 Not detected NOTD      Parainfluenza 2 Not detected NOTD      Parainfluenza 3 Not detected NOTD      Parainfluenza virus 4 Not detected NOTD      RSV by PCR Not detected NOTD      B. parapertussis, PCR Not detected NOTD      Bordetella pertussis - PCR Not detected NOTD      Chlamydophila pneumoniae DNA, QL, PCR Not detected NOTD      Mycoplasma pneumoniae DNA, QL, PCR Not detected NOTD         Signed By: Savanna Hood     December 9, 2022      I spent a lot of minutes with the patient in face-to-face consultation, of which less than 50% was spent in counseling and coordination of care as described above    Disclaimer: Sections of this note are dictated using utilizing voice recognition software. Minor typographical errors may be present. If questions arise, please do not hesitate to contact me or call our department. *ATTENTION:  This note has been created by a medical student for educational purposes only. Please do not refer to the content of this note for clinical decision-making, billing, or other purposes. Please see attending physicians note to obtain clinical information on this patient. *

## 2022-12-10 VITALS
BODY MASS INDEX: 38.92 KG/M2 | HEART RATE: 73 BPM | OXYGEN SATURATION: 98 % | TEMPERATURE: 97.9 F | SYSTOLIC BLOOD PRESSURE: 114 MMHG | DIASTOLIC BLOOD PRESSURE: 59 MMHG | RESPIRATION RATE: 16 BRPM | WEIGHT: 206 LBS

## 2022-12-10 LAB
ANION GAP SERPL CALC-SCNC: 8 MMOL/L (ref 3–18)
BUN SERPL-MCNC: 12 MG/DL (ref 7–18)
BUN/CREAT SERPL: 11 (ref 12–20)
CALCIUM SERPL-MCNC: 8.8 MG/DL (ref 8.5–10.1)
CHLORIDE SERPL-SCNC: 104 MMOL/L (ref 100–111)
CO2 SERPL-SCNC: 26 MMOL/L (ref 21–32)
CREAT SERPL-MCNC: 1.08 MG/DL (ref 0.6–1.3)
ERYTHROCYTE [DISTWIDTH] IN BLOOD BY AUTOMATED COUNT: 14.6 % (ref 11.6–14.5)
GLUCOSE SERPL-MCNC: 141 MG/DL (ref 74–99)
HAV IGM SER QL: NORMAL
HBV CORE IGM SER QL: NORMAL
HBV SURFACE AG SER QL: <0.1 INDEX
HBV SURFACE AG SER QL: NEGATIVE
HCT VFR BLD AUTO: 37.7 % (ref 35–45)
HCV AB SER IA-ACNC: NORMAL INDEX
HCV AB SERPL QL IA: NORMAL
HCV COMMENT,HCGAC: NORMAL
HGB BLD-MCNC: 11.5 G/DL (ref 12–16)
MCH RBC QN AUTO: 25 PG (ref 24–34)
MCHC RBC AUTO-ENTMCNC: 30.5 G/DL (ref 31–37)
MCV RBC AUTO: 82 FL (ref 78–100)
NRBC # BLD: 0 K/UL (ref 0–0.01)
NRBC BLD-RTO: 0 PER 100 WBC
PLATELET # BLD AUTO: 149 K/UL (ref 135–420)
PMV BLD AUTO: 11.8 FL (ref 9.2–11.8)
POTASSIUM SERPL-SCNC: 4.4 MMOL/L (ref 3.5–5.5)
RBC # BLD AUTO: 4.6 M/UL (ref 4.2–5.3)
SODIUM SERPL-SCNC: 138 MMOL/L (ref 136–145)
SP1: NORMAL
SP2: NORMAL
SP3: NORMAL
WBC # BLD AUTO: 19.2 K/UL (ref 4.6–13.2)

## 2022-12-10 PROCEDURE — 99239 HOSP IP/OBS DSCHRG MGMT >30: CPT | Performed by: INTERNAL MEDICINE

## 2022-12-10 PROCEDURE — G0378 HOSPITAL OBSERVATION PER HR: HCPCS

## 2022-12-10 PROCEDURE — 80048 BASIC METABOLIC PNL TOTAL CA: CPT

## 2022-12-10 PROCEDURE — 74011250637 HC RX REV CODE- 250/637

## 2022-12-10 PROCEDURE — 85027 COMPLETE CBC AUTOMATED: CPT

## 2022-12-10 PROCEDURE — 74011000250 HC RX REV CODE- 250

## 2022-12-10 RX ORDER — FAMOTIDINE 20 MG/1
20 TABLET, FILM COATED ORAL 2 TIMES DAILY
Qty: 20 TABLET | Refills: 0 | Status: SHIPPED | OUTPATIENT
Start: 2022-12-10 | End: 2022-12-17 | Stop reason: SDUPTHER

## 2022-12-10 RX ORDER — ONDANSETRON 4 MG/1
4 TABLET, ORALLY DISINTEGRATING ORAL
Qty: 6 TABLET | Refills: 0 | Status: SHIPPED | OUTPATIENT
Start: 2022-12-10

## 2022-12-10 RX ADMIN — SODIUM CHLORIDE, PRESERVATIVE FREE 10 ML: 5 INJECTION INTRAVENOUS at 05:29

## 2022-12-10 RX ADMIN — FAMOTIDINE 20 MG: 20 TABLET ORAL at 09:37

## 2022-12-10 NOTE — DISCHARGE SUMMARY
Discharge Summary     Patient ID:  Malick Gimenez  [id number]  17 y.o.  1995  Body mass index is 38.92 kg/m². PCP on record: Willena Dakins, MD    Admit date: 12/8/2022  Discharge date and time: 12/10/22    Reason for admission: LOW Platelets     Discharge Diagnoses:                                           1 acute thrombocytopenia  2 acute immune mediated thrombocytopenia  3 personal history of asthma      Consults: Hematology/Oncology          Hospital Course by problems:  HPI per admitting MD\" HPI: Malick Gimenez is a 28 y.o. female with a PMHx of who presented to the ED due to concern for low platelet count. Patient had a routine PCP appointment yesterday and had labs drawn. This morning she got a call from the office stating she needed to go to the ER d/t low platelet count. States earlier this year she had a prolonged period of 2.5-3 months, but by the time she got a PCP appointment bleeding had stopped. States she always bruises easily; has not noticed increasing bruising recently. Denies chronic anticoagulation use. Denies chest pain, sob, fever, chills, nvd, cough, abdominal pain, palpitations, urinary symptoms, recent travel anywhere. Denies smoking, drinking and illicit drug use. In the ED, afebrile, 92 HR, 136/107, 19 resp, 98% RA. CBC unremarkable except platelet count of 10. Hcg negative. Type and cross done. Dr Bridger Rausch was consulted by the ER. Received decadron in the ER. Home medications reconciled with the patient   Mother at bedside. Aware of plan. \"    -Since admission patient was seen by hematologist Dr. Adrián Yousif  . Patient received dose of immunoglobulin, steroids being discharged home for 4 more days of high dose steroid with GI prophylaxis    - Patient's platelet count on admission was 68 before on 12 -8 -22 noted to be 10 patient remained asymptomatic at that time following therapy her platelet count improved to 149.   No associated leukocytosis leukopenia or severe anemia was noted. Now some leukocytosis noted needs to be followed up as outpatient likely secondary to steroid use no evidence of any infection    Follow-up with PCP and follow-up with hematology    Patient seen and examined by me on discharge day. Pertinent Findings:  Visit Vitals  BP (!) 114/59   Pulse 73   Temp 97.9 °F (36.6 °C)   Resp 16   Wt 93.4 kg (206 lb)   SpO2 98%   BMI 38.92 kg/m²      Stable     Significant Diagnostic Studies:    -Respiratory panel including COVID was negative    - HEPATITIS C AB [[id number]] (Order [id number])  Lab  Date: 12/9/2022 Department: [facility] Step[facility] Released By/Authorizing: Robbie Fuller MD (auto-released)       HEPATITIS C AB: Patient Communication     Released  Not seen     HEPATITIS C AB  Order: [id number]  Collected 12/9/2022 13:00      Status: Final result      Next appt: None      0 Result Notes      1  Topic      Component Ref Range & Units 12/9/22 1300    Hepatitis C virus Ab <0.80 Index 0.17    Hep C virus Ab Interp. NEG   Negative    Hep C  virus Ab comment          Comment: Index <0.80. ......................... Trula Blew Negative   Index > or = to 0.80 and <1.00. .... Trula Blew Trula Blew Equivocal   Index >1.00. ......................... Trula Blew Positive            For Equivocal or Positive results, confirmation with Hepatitis C RNA by PCR or bDNA is suggested.     [address] [place] [address]                Specimen Collected: 12/09/22 13:00 Last Resulted: 12/09/22 14:42       Order Details       Lab and Collection Details       Routing       Result History       View Encounter Conversation             Result Care Coordination          - HIV 1/2 [place]ra [place] RFLX CONFIRM [[id number]] (Order [id number])  Lab  Date: 12/8/2022 Department: [facility] Step[facility] Released By/Authorizing: Dutch Aragon NP (auto-released)       HIV 1/2 AG/AB, 4TH GENERATION,W RFLX CONFIRM: Patient Communication     Released  Not seen     HIV 1/2 AG/AB, 4TH GENERATION,W RFLX CONFIRM  Order: [id number]  Collected 12/9/2022 13:00      Status: Final result      Next appt: None      0 Result Notes      Component Ref Range & Units 12/9/22 1300    HIV 1/2 Interpretation NR   NONREACTIVE    HIV 1/2 result comment   SEE NOTE    Comment: While this assay is highly sensitive, a non-reactive/negative result for HIV antibodies HIV-1 and HIV-2 and p24 antigen, does not exclude the possibility of exposure to or infection with HIV. HIV antibodies and/or p24 antigen may be undetectable in some stages of the infection and in some clinical conditions. Test performed by the [employer] HIV Ag/Ab Combo (CHIV), 4th generation assay. Recommend consulting relevant CDC guidelines for informing test subject of the result and its interpretation.     [address] [place] [address]                Specimen Collected: 12/09/22 13:00 Last Resulted: 12/09/22 14:42       Order Details       Lab and Collection Details       Routing       Result History       View Encounter Conversation               Pertinent Lab Data:  Recent Labs     12/10/22  0605 12/09/22  1214   WBC 19.2* 12.9   HGB 11.5* 11.3*   HCT 37.7 35.6    68*     Recent Labs     12/10/22  0605 12/09/22  1214    137   K 4.4 3.6    105   CO2 26 23   * 231*   BUN 12 9   CREA 1.08 1.07   CA 8.8 8.9       DISCHARGE MEDICATIONS:   @  Discharge Medication List as of 12/10/2022 10:44 AM        START taking these medications    Details   famotidine (PEPCID) 20 mg tablet Take 1 Tablet by mouth two (2) times a day., Normal, Disp-20 Tablet, R-0      ondansetron (ZOFRAN ODT) 4 mg disintegrating tablet Take 1 Tablet by mouth every eight (8) hours as needed for Nausea or Vomiting., Normal, Disp-6 Tablet, R-0      dexAMETHasone 20 mg tab 1 tab po daily after food, Normal, Disp-4 Tablet, R-0           CONTINUE these medications which have NOT CHANGED    Details   escitalopram oxalate (LEXAPRO) 10 mg tablet Take 1 Tablet by mouth daily., Normal, Disp-30 Tablet, R-1      budesonide-formoteroL (SYMBICORT) 160-4.5 mcg/actuation HFAA Take 2 Puffs by inhalation two (2) times a day., Normal, Disp-10.2 g, R-0               My Recommended Diet, Activity, Wound Care, and follow-up labs are listed in the patient's Discharge Insturctions which I have personally completed and reviewed. Disposition:     [x] Home with family     [] [facility] PT/RN   [] SNF/NH   [] Inpatient Rehab/[facility]  Condition at Discharge:  Stable    Follow up with:   PCP : Marley Trinh MD      Please follow-up tests/labs that are still pendin. None  2.    >30 minutes spent coordinating this discharge (review instructions/follow-up, prescriptions, preparing report for sign off)    Disclaimer: Sections of this note are dictated utilizing voice recognition software, which may have resulted in some phonetic based errors in grammar and contents. Even though attempts were made to correct all the mistakes, some may have been missed, and remained in the body of the document. If questions arise, please contact our department.     Signed:  James Esrtada MD

## 2022-12-10 NOTE — ROUTINE PROCESS
Discharge order noted. Discharge instructions reviewed with patient and mother. PIV removed. AVS given to patient. Patient verbalized understanding. Patient transported home by mother.     Afua Niño, BSN, RN

## 2022-12-10 NOTE — PROGRESS NOTES
D/C order noted for today. Orders reviewed. No needs identified at this time. CM spoke with patient, said her mother will be transporting patient home today at time of discharge. CM remains available if needed.              Hoda Dyson [phone]

## 2022-12-10 NOTE — PROGRESS NOTES
Problem: Falls - Risk of  Goal: *Absence of Falls  Description: Document Enrique Ethan Fall Risk and appropriate interventions in the flowsheet.   Outcome: Progressing Towards Goal  Note: Fall Risk Interventions:            Medication Interventions: Assess postural VS orthostatic hypotension, Evaluate medications/consider consulting pharmacy, Patient to call before getting OOB, Teach patient to arise slowly                   Problem: Patient Education: Go to Patient Education Activity  Goal: Patient/Family Education  Outcome: Progressing Towards Goal

## 2022-12-10 NOTE — ROUTINE PROCESS
[address] a call from the patient stating the pharmacy is not able to fill the prescription for dexamethasone. [facility] contacted. Pharmacy tech stated the insurance will not cover Dexamethasone 20 mg tablets, however it will cover 4 mg tablet. Dr. Janay Ambrosio paged and informed. Verbal order obtained from Dr. Janay Ambrosio for dexamethasone 4 mg, take 5 tablets daily. [facility] pharmacy contacted, verbal order given to Prachi Weldon. Patient notified that updated prescription was called into pharmacy.      SHAY GarciaN, RN

## 2022-12-10 NOTE — PROGRESS NOTES
1950: Bedside and Verbal shift change report given to ABHINAV Paez (oncoming nurse) by Pat Gong RN (offgoing nurse). Report included the following information SBAR, Kardex, MAR, and Cardiac Rhythm NSR . Patient alert and oriented. No complaints of pain. All needs me at this time. 2245:Collected stool sample and sent to lab.    0700: Bedside and Verbal shift change report given to Jose Elias England RN (oncoming nurse) by Stephanie Chong RN (offgoing nurse). Report included the following information SBAR, Kardex, MAR, and Cardiac Rhythm NSR .

## 2022-12-10 NOTE — ROUTINE PROCESS
Bedside and Verbal shift change report given to ABHINAV Jimenez and Peter Lott RN (oncoming nurse) by Meir Eckert RN (offgoing nurse). Report included the following information SBAR, Kardex, Recent Results, and Cardiac Rhythm NSR .      Wound Prevention Checklist    Patient: Miguelito Guajardo (33 y.o. female)  Date: 12/10/2022  Diagnosis: Acute ITP ([facility] [address] [D69.3] Acute ITP ([facility] [address]    Precautions:         []  Heel prevention boots placed on patient    []  Patient turned q2h during shift    []  Lift team ordered    []  Patient on [place] bed/Specialty bed    []  Each Wound is documented during shift (Stage, Color, drainage, odor, measurements, and dressings)    [x]  Dual skin check done with Sarah Mcneal RN

## 2022-12-12 ENCOUNTER — DOCUMENTATION ONLY (OUTPATIENT)
Dept: FAMILY MEDICINE CLINIC | Age: 27
End: 2022-12-12

## 2022-12-12 LAB
ANA TITR SER IF: NEGATIVE {TITER}
H PYLORI IGA SER-ACNC: <9 UNITS (ref 0–8.9)
H PYLORI IGG SER IA-ACNC: 3.53 INDEX VALUE (ref 0–0.79)
H PYLORI IGM SER-ACNC: <9 UNITS (ref 0–8.9)
HAV IGM SER QL: NEGATIVE
HBV CORE IGM SER QL: NEGATIVE
HBV SURFACE AG SER QL: <0.1 INDEX
HBV SURFACE AG SER QL: NEGATIVE
HCV AB SER IA-ACNC: 0.11 INDEX
HCV AB SERPL QL IA: NEGATIVE
HCV COMMENT,HCGAC: NORMAL
PLEASE NOTE, 734348: NORMAL
SP1: NORMAL
SP2: NORMAL
SP3: NORMAL

## 2022-12-12 NOTE — PROGRESS NOTES
Care Transitions Initial Follow Up    Outreach made within 2 business days of discharge: Yes    Patient: Benjamin Jeffries Patient : 1995   MRN: [id number]  Reason for Admission: acute idiopathic thrombocytopenia purpura  Discharge Date: 12/10/22       Review patient's chart for TCM outreach. Patient now with new PCP that was updated during admission. No further action needed. Follow Up  No future appointments.     Mel Newsome RN

## 2022-12-13 LAB
H PYLORI AG STL QL IA: NEGATIVE
SPECIMEN SOURCE: NORMAL

## 2022-12-16 PROCEDURE — 99285 EMERGENCY DEPT VISIT HI MDM: CPT

## 2022-12-17 ENCOUNTER — HOSPITAL ENCOUNTER (EMERGENCY)
Age: 27
Discharge: HOME | End: 2022-12-17
Attending: EMERGENCY MEDICINE
Payer: COMMERCIAL

## 2022-12-17 ENCOUNTER — APPOINTMENT (OUTPATIENT)
Dept: GENERAL RADIOLOGY | Age: 27
End: 2022-12-17
Attending: EMERGENCY MEDICINE
Payer: COMMERCIAL

## 2022-12-17 VITALS
BODY MASS INDEX: 38.92 KG/M2 | WEIGHT: 206 LBS | RESPIRATION RATE: 16 BRPM | HEART RATE: 88 BPM | SYSTOLIC BLOOD PRESSURE: 140 MMHG | DIASTOLIC BLOOD PRESSURE: 96 MMHG | TEMPERATURE: 98.1 F

## 2022-12-17 DIAGNOSIS — R07.9 CHEST PAIN, UNSPECIFIED TYPE: ICD-10-CM

## 2022-12-17 DIAGNOSIS — K21.9 GASTROESOPHAGEAL REFLUX DISEASE, UNSPECIFIED WHETHER ESOPHAGITIS PRESENT: Primary | ICD-10-CM

## 2022-12-17 LAB
ALBUMIN SERPL-MCNC: 3 G/DL (ref 3.4–5)
ALBUMIN/GLOB SERPL: 0.7 {RATIO} (ref 0.8–1.7)
ALP SERPL-CCNC: 108 U/L (ref 45–117)
ALT SERPL-CCNC: 104 U/L (ref 13–56)
ANION GAP SERPL CALC-SCNC: 10 MMOL/L (ref 3–18)
AST SERPL-CCNC: 43 U/L (ref 10–38)
ATRIAL RATE: 79 BPM
BASOPHILS # BLD: 0 K/UL (ref 0–0.1)
BASOPHILS NFR BLD: 0 % (ref 0–2)
BILIRUB SERPL-MCNC: 0.3 MG/DL (ref 0.2–1)
BUN SERPL-MCNC: 14 MG/DL (ref 7–18)
BUN/CREAT SERPL: 15 (ref 12–20)
CALCIUM SERPL-MCNC: 9 MG/DL (ref 8.5–10.1)
CALCULATED P AXIS, ECG09: 42 DEGREES
CALCULATED R AXIS, ECG10: 74 DEGREES
CALCULATED T AXIS, ECG11: 3 DEGREES
CHLORIDE SERPL-SCNC: 102 MMOL/L (ref 100–111)
CO2 SERPL-SCNC: 28 MMOL/L (ref 21–32)
CREAT SERPL-MCNC: 0.96 MG/DL (ref 0.6–1.3)
DIAGNOSIS, 93000: NORMAL
DIFFERENTIAL METHOD BLD: ABNORMAL
EOSINOPHIL # BLD: 0.1 K/UL (ref 0–0.4)
EOSINOPHIL NFR BLD: 1 % (ref 0–5)
ERYTHROCYTE [DISTWIDTH] IN BLOOD BY AUTOMATED COUNT: 14.5 % (ref 11.6–14.5)
GLOBULIN SER CALC-MCNC: 4.4 G/DL (ref 2–4)
GLUCOSE SERPL-MCNC: 83 MG/DL (ref 74–99)
HCT VFR BLD AUTO: 41 % (ref 35–45)
HGB BLD-MCNC: 13.2 G/DL (ref 12–16)
IMM GRANULOCYTES # BLD AUTO: 0.1 K/UL (ref 0–0.04)
IMM GRANULOCYTES NFR BLD AUTO: 1 % (ref 0–0.5)
LYMPHOCYTES # BLD: 2.5 K/UL (ref 0.9–3.6)
LYMPHOCYTES NFR BLD: 25 % (ref 21–52)
MCH RBC QN AUTO: 25.2 PG (ref 24–34)
MCHC RBC AUTO-ENTMCNC: 32.2 G/DL (ref 31–37)
MCV RBC AUTO: 78.4 FL (ref 78–100)
MONOCYTES # BLD: 0.8 K/UL (ref 0.05–1.2)
MONOCYTES NFR BLD: 8 % (ref 3–10)
NEUTS SEG # BLD: 6.5 K/UL (ref 1.8–8)
NEUTS SEG NFR BLD: 65 % (ref 40–73)
NRBC # BLD: 0 K/UL (ref 0–0.01)
NRBC BLD-RTO: 0 PER 100 WBC
P-R INTERVAL, ECG05: 116 MS
PLATELET # BLD AUTO: 325 K/UL (ref 135–420)
PMV BLD AUTO: 10.9 FL (ref 9.2–11.8)
POTASSIUM SERPL-SCNC: 3.8 MMOL/L (ref 3.5–5.5)
PROT SERPL-MCNC: 7.4 G/DL (ref 6.4–8.2)
Q-T INTERVAL, ECG07: 380 MS
QRS DURATION, ECG06: 84 MS
QTC CALCULATION (BEZET), ECG08: 435 MS
RBC # BLD AUTO: 5.23 M/UL (ref 4.2–5.3)
SODIUM SERPL-SCNC: 140 MMOL/L (ref 136–145)
TROPONIN-HIGH SENSITIVITY: 15 NG/L (ref 0–54)
VENTRICULAR RATE, ECG03: 79 BPM
WBC # BLD AUTO: 10 K/UL (ref 4.6–13.2)

## 2022-12-17 PROCEDURE — 93005 ELECTROCARDIOGRAM TRACING: CPT

## 2022-12-17 PROCEDURE — 80053 COMPREHEN METABOLIC PANEL: CPT

## 2022-12-17 PROCEDURE — 84484 ASSAY OF TROPONIN QUANT: CPT

## 2022-12-17 PROCEDURE — 85025 COMPLETE CBC W/AUTO DIFF WBC: CPT

## 2022-12-17 PROCEDURE — 71046 X-RAY EXAM CHEST 2 VIEWS: CPT

## 2022-12-17 RX ORDER — FAMOTIDINE 20 MG/1
20 TABLET, FILM COATED ORAL 2 TIMES DAILY
Qty: 20 TABLET | Refills: 0 | Status: SHIPPED | OUTPATIENT
Start: 2022-12-17 | End: 2023-01-16

## 2022-12-17 NOTE — DISCHARGE INSTRUCTIONS
1.  Take medications as prescribed  2. Make an appointment follow-up with your PCP. 3.  Return to the emergency department for any worsening or concerning symptoms. 4.  Eat a bland diet. Avoid spicy and greasy foods. Avoid acidic drinks and sodas.

## 2022-12-17 NOTE — ED PROVIDER NOTES
EMERGENCY DEPARTMENT HISTORY AND PHYSICAL EXAM      Date: 12/17/2022  Patient Name: Anum Smith      History of Presenting Illness     Chief Complaint   Patient presents with    Chest Pain       Location/Duration/Severity/Modifying factors   Chief Complaint   Patient presents with    Chest Pain       HPI:  Anum Smith is a 28 y.o. female with history as listed presents with a concern of with report of a little chest pain when watching TV. The patient reported being discharged Thursday due to low platelet count. Patient had experienced some dizziness and has recently been diagnosed with TPP. There is no shortness of breath. The patient has been afebrile without a cough. The pain is described as a sharp burning pain that radiates up to the front of the throat. Associated Symptoms:see ROS      There are no other complaints, changes, or physical findings at this time. PCP: Sheree Lazo MD    Current Outpatient Medications   Medication Sig Dispense Refill    famotidine (PEPCID) 20 mg tablet Take 1 Tablet by mouth two (2) times a day for 30 days. 20 Tablet 0    ondansetron (ZOFRAN ODT) 4 mg disintegrating tablet Take 1 Tablet by mouth every eight (8) hours as needed for Nausea or Vomiting. 6 Tablet 0    escitalopram oxalate (LEXAPRO) 10 mg tablet Take 1 Tablet by mouth daily. (Patient not taking: No sig reported) 30 Tablet 1    budesonide-formoteroL (SYMBICORT) 160-4.5 mcg/actuation HFAA Take 2 Puffs by inhalation two (2) times a day.  10.2 g 0       Past History     Past Medical History:  Past Medical History:   Diagnosis Date    Acute ITP ([address]     Asthma        Past Surgical History:  Past Surgical History:   Procedure Laterality Date    HX CHOLECYSTECTOMY         Family History:  Family History   Problem Relation Age of Onset    Diabetes Mother     Hypertension Mother     Hypertension Father     Diabetes Father        Social History:  Social History     Tobacco Use    Smoking status: Never Smokeless tobacco: Never   Vaping Use    Vaping Use: Every day    Substances: Nicotine    Devices: Pre-filled or refillable cartridge   Substance Use Topics    Alcohol use: Yes     Comment: Rare    Drug use: Never       Allergies:  No Known Allergies      Review of Systems     Review of Systems   All other systems reviewed and are negative. Physical Exam     Physical Exam  Vitals and nursing note reviewed. Constitutional:       General: She is awake. She is not in acute distress. Appearance: Normal appearance. She is well-developed and well-groomed. She is not ill-appearing, toxic-appearing or diaphoretic. HENT:      Head: Normocephalic and atraumatic. Right Ear: External ear normal.      Left Ear: External ear normal.      Mouth/Throat:      Mouth: Mucous membranes are moist.      Pharynx: Oropharynx is clear. Uvula midline. No pharyngeal swelling, oropharyngeal exudate, posterior oropharyngeal erythema or uvula swelling. Eyes:      General: Lids are normal. Vision grossly intact. No scleral icterus. Right eye: No discharge. Left eye: No discharge. Extraocular Movements: Extraocular movements intact. Conjunctiva/sclera: Conjunctivae normal.      Pupils: Pupils are equal, round, and reactive to light. Neck:      Trachea: Trachea and phonation normal.   Cardiovascular:      Rate and Rhythm: Normal rate and regular rhythm. Pulses: Normal pulses. Heart sounds: Normal heart sounds, S1 normal and S2 normal. No murmur heard. No friction rub. No gallop. Pulmonary:      Effort: Pulmonary effort is normal. No respiratory distress. Breath sounds: Normal breath sounds and air entry. No stridor. No wheezing, rhonchi or rales. Chest:      Chest wall: No tenderness. Abdominal:      General: Abdomen is protuberant. Bowel sounds are normal. There is no distension. Palpations: Abdomen is soft. There is no mass. Tenderness:  There is no abdominal tenderness. There is no guarding. Genitourinary:     Vagina: No vaginal discharge. Musculoskeletal:         General: No swelling, tenderness, deformity or signs of injury. Normal range of motion. Right shoulder: Normal.      Left shoulder: Normal.      Right upper arm: Normal.      Left upper arm: Normal.      Right elbow: Normal.      Left elbow: Normal.      Right forearm: Normal.      Left forearm: Normal.      Right wrist: Normal. Normal pulse. Left wrist: Normal. Normal pulse. Right hand: Normal. Normal capillary refill. Left hand: Normal. Normal capillary refill. Cervical back: Full passive range of motion without pain, normal range of motion and neck supple. No rigidity or tenderness. Right lower leg: Normal. No edema. Left lower leg: Normal. No edema. Right ankle: Normal.      Left ankle: Normal.   Skin:     General: Skin is warm and dry. Capillary Refill: Capillary refill takes less than 2 seconds. Coloration: Skin is not jaundiced or pale. Findings: No bruising, erythema, lesion or rash. Neurological:      General: No focal deficit present. Mental Status: She is alert and oriented to person, place, and time. Mental status is at baseline. GCS: GCS eye subscore is 4. GCS verbal subscore is 5. GCS motor subscore is 6. Cranial Nerves: Cranial nerves 2-12 are intact. No cranial nerve deficit or facial asymmetry. Sensory: Sensation is intact. No sensory deficit. Motor: Motor function is intact. No weakness. Coordination: Coordination is intact. Coordination normal.      Gait: Gait is intact. Psychiatric:         Attention and Perception: Attention and perception normal.         Mood and Affect: Mood and affect normal.         Speech: Speech normal.         Behavior: Behavior normal. Behavior is cooperative. Thought Content:  Thought content normal.         Cognition and Memory: Cognition and memory normal. Judgment: Judgment normal.       Lab and Diagnostic Study Results     Labs -  No results found for this or any previous visit (from the past 24 hour(s)). Radiologic Studies -   XR CHEST PA LAT   Final Result      No evidence of acute pulmonary disease. Band shaped man-made density projects over the soft tissues of the left axilla   on the frontal view, presumed to be external to the patient, clinical   correlation, please. No Acute cardiopulmonary process. Procedures and Critical Care       Performed by: Bharath De La Paz MD    Procedures   EKG: unchanged from previous tracings, normal sinus rhythm, nonspecific ST and T waves changes, 79 bpm with no acute ST segment elevation axis and interval.        Bharath De La Paz MD    Medical Decision Making and ED Course   - I am the first and primary provider for this patient AND AM THE PRIMARY PROVIDER OF RECORD. - I reviewed the vital signs, available nursing notes, past medical history, past surgical history, family history and social history. - Initial assessment performed. The patients presenting problems have been discussed, and the staff are in agreement with the care plan formulated and outlined with them. I have encouraged them to ask questions as they arise throughout their visit. Vital Signs-Reviewed the patient's vital signs. No data found. Provider Notes (Medical Decision Making):     MDM  Number of Diagnoses or Management Options  Chest pain, unspecified type  Gastroesophageal reflux disease, unspecified whether esophagitis present  Diagnosis management comments: 3The patient presented to the emergency department with a sharp burning chest pain was prior to arrival.  The patient was watching TV when the discomfort started. The patient's evaluation emergency department is unremarkable. The concern is that the patient is experiencing GERD with possible esophageal spasms. The patient will be started on Pepcid.   She is to eat a bland diet and avoid spicy and greasy foods. At the time of disposition the patient is stable and in no acute distress obvious discomfort. She is to continue home medication as previously prescribed. ED Course:          ------------------------------------------------------------------------------------------------------------        Consultations:       Consultations:       Disposition         Discharged      Diagnosis     Clinical Impression:   1. Gastroesophageal reflux disease, unspecified whether esophagitis present    2.  Chest pain, unspecified type        Attestations:    Osiris Askew MD

## 2024-01-10 ENCOUNTER — TELEPHONE (OUTPATIENT)
Age: 29
End: 2024-01-10

## 2024-01-10 NOTE — TELEPHONE ENCOUNTER
LVM for patient to call office back to schedule new patient appointment with our office for Fatty Liver per referral.

## 2024-02-13 ENCOUNTER — TELEPHONE (OUTPATIENT)
Age: 29
End: 2024-02-13

## 2024-02-14 ENCOUNTER — HOSPITAL ENCOUNTER (OUTPATIENT)
Facility: HOSPITAL | Age: 29
Setting detail: SPECIMEN
Discharge: HOME | End: 2024-02-17
Payer: COMMERCIAL

## 2024-02-14 ENCOUNTER — OFFICE VISIT (OUTPATIENT)
Age: 29
End: 2024-02-14

## 2024-02-14 VITALS
SYSTOLIC BLOOD PRESSURE: 116 MMHG | DIASTOLIC BLOOD PRESSURE: 70 MMHG | HEIGHT: 61 IN | TEMPERATURE: 97.9 F | OXYGEN SATURATION: 99 % | BODY MASS INDEX: 39.08 KG/M2 | RESPIRATION RATE: 16 BRPM | WEIGHT: 207 LBS | HEART RATE: 96 BPM

## 2024-02-14 DIAGNOSIS — R74.8 ELEVATED LIVER ENZYMES: ICD-10-CM

## 2024-02-14 DIAGNOSIS — K76.0 FATTY LIVER: ICD-10-CM

## 2024-02-14 DIAGNOSIS — R74.8 ELEVATED LIVER ENZYMES: Primary | ICD-10-CM

## 2024-02-14 PROBLEM — J96.01 ACUTE RESPIRATORY FAILURE WITH HYPOXEMIA (HCC): Status: RESOLVED | Noted: 2020-02-25 | Resolved: 2024-02-14

## 2024-02-14 LAB
ALBUMIN SERPL-MCNC: 3.7 G/DL (ref 3.4–5)
ALBUMIN/GLOB SERPL: 1.1 (ref 0.8–1.7)
ALP SERPL-CCNC: 108 U/L (ref 45–117)
ALT SERPL-CCNC: 47 U/L (ref 13–56)
ANION GAP SERPL CALC-SCNC: 4 MMOL/L (ref 3–18)
AST SERPL-CCNC: 27 U/L (ref 10–38)
BASOPHILS # BLD: 0 K/UL (ref 0–0.1)
BASOPHILS NFR BLD: 0 % (ref 0–2)
BILIRUB DIRECT SERPL-MCNC: 0.2 MG/DL (ref 0–0.2)
BILIRUB SERPL-MCNC: 0.7 MG/DL (ref 0.2–1)
BUN SERPL-MCNC: 4 MG/DL (ref 7–18)
BUN/CREAT SERPL: 5 (ref 12–20)
CALCIUM SERPL-MCNC: 9 MG/DL (ref 8.5–10.1)
CHLORIDE SERPL-SCNC: 106 MMOL/L (ref 100–111)
CO2 SERPL-SCNC: 31 MMOL/L (ref 21–32)
CREAT SERPL-MCNC: 0.86 MG/DL (ref 0.6–1.3)
DIFFERENTIAL METHOD BLD: ABNORMAL
EOSINOPHIL # BLD: 0.1 K/UL (ref 0–0.4)
EOSINOPHIL NFR BLD: 1 % (ref 0–5)
ERYTHROCYTE [DISTWIDTH] IN BLOOD BY AUTOMATED COUNT: 14 % (ref 11.6–14.5)
FERRITIN SERPL-MCNC: 28 NG/ML (ref 8–388)
GLOBULIN SER CALC-MCNC: 3.3 G/DL (ref 2–4)
GLUCOSE SERPL-MCNC: 88 MG/DL (ref 74–99)
HCT VFR BLD AUTO: 44.1 % (ref 35–45)
HGB BLD-MCNC: 14 G/DL (ref 12–16)
IMM GRANULOCYTES # BLD AUTO: 0 K/UL (ref 0–0.04)
IMM GRANULOCYTES NFR BLD AUTO: 0 % (ref 0–0.5)
IRON SATN MFR SERPL: 17 % (ref 20–50)
IRON SERPL-MCNC: 73 UG/DL (ref 50–175)
LYMPHOCYTES # BLD: 1.8 K/UL (ref 0.9–3.6)
LYMPHOCYTES NFR BLD: 27 % (ref 21–52)
MCH RBC QN AUTO: 28.2 PG (ref 24–34)
MCHC RBC AUTO-ENTMCNC: 31.7 G/DL (ref 31–37)
MCV RBC AUTO: 88.7 FL (ref 78–100)
MONOCYTES # BLD: 0.5 K/UL (ref 0.05–1.2)
MONOCYTES NFR BLD: 7 % (ref 3–10)
NEUTS SEG # BLD: 4.4 K/UL (ref 1.8–8)
NEUTS SEG NFR BLD: 64 % (ref 40–73)
NRBC # BLD: 0 K/UL (ref 0–0.01)
NRBC BLD-RTO: 0 PER 100 WBC
PLATELET # BLD AUTO: 235 K/UL (ref 135–420)
PMV BLD AUTO: 11.9 FL (ref 9.2–11.8)
POTASSIUM SERPL-SCNC: 3.7 MMOL/L (ref 3.5–5.5)
PROT SERPL-MCNC: 7 G/DL (ref 6.4–8.2)
RBC # BLD AUTO: 4.97 M/UL (ref 4.2–5.3)
SODIUM SERPL-SCNC: 141 MMOL/L (ref 136–145)
TIBC SERPL-MCNC: 426 UG/DL (ref 250–450)
WBC # BLD AUTO: 6.8 K/UL (ref 4.6–13.2)

## 2024-02-14 PROCEDURE — 85025 COMPLETE CBC W/AUTO DIFF WBC: CPT

## 2024-02-14 PROCEDURE — 82728 ASSAY OF FERRITIN: CPT

## 2024-02-14 PROCEDURE — 82103 ALPHA-1-ANTITRYPSIN TOTAL: CPT

## 2024-02-14 PROCEDURE — 83540 ASSAY OF IRON: CPT

## 2024-02-14 PROCEDURE — 82390 ASSAY OF CERULOPLASMIN: CPT

## 2024-02-14 PROCEDURE — 86037 ANCA TITER EACH ANTIBODY: CPT

## 2024-02-14 PROCEDURE — 80076 HEPATIC FUNCTION PANEL: CPT

## 2024-02-14 PROCEDURE — 86706 HEP B SURFACE ANTIBODY: CPT

## 2024-02-14 PROCEDURE — 36415 COLL VENOUS BLD VENIPUNCTURE: CPT

## 2024-02-14 PROCEDURE — 86708 HEPATITIS A ANTIBODY: CPT

## 2024-02-14 PROCEDURE — 80048 BASIC METABOLIC PNL TOTAL CA: CPT

## 2024-02-14 PROCEDURE — 86704 HEP B CORE ANTIBODY TOTAL: CPT

## 2024-02-14 PROCEDURE — 86015 ACTIN ANTIBODY EACH: CPT

## 2024-02-14 PROCEDURE — 83550 IRON BINDING TEST: CPT

## 2024-02-14 RX ORDER — ERGOCALCIFEROL 1.25 MG/1
50000 CAPSULE ORAL WEEKLY
COMMUNITY
Start: 2024-01-19

## 2024-02-14 RX ORDER — BUSPIRONE HYDROCHLORIDE 5 MG/1
5 TABLET ORAL AS NEEDED
COMMUNITY

## 2024-02-14 NOTE — PROGRESS NOTES
[facility]      Jim Silva MD, FACP, FACG, FAASLD      Martina Henriquez, PA-DAYANA Marsh, [facility]   Christa Helmsfrancheska, [facility]   Nieves Robby, [facility]-  Napoleon Stewart, [facility]   Carolina Jordan, [facility]   Elaine Justin, [facility]      [facility]   [address], [address]   [place]  [address] [phone]   FAX: [phone]  [facility]   [address], [address]   [place]  [address] [phone]   FAX: [phone]     Patient Care Team:  Chace Allison MD as PCP - General  Michelle Hester MD (Hematology and Oncology)    Patient Active Problem List   Diagnosis    Asthma    Anxiety and depression    Acute ITP (HCC)    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 All medical records sent by the referring physicians were reviewed including imaging studies     The patient is a 28 y.o. female who is suspected to have fatty liver disease based upon  ultrasound.      The most recent laboratory studies indicate that the liver transaminases are normal,   alkaline phosphatase is normal, tests of hepatic synthetic and metabolic function are   normal, and the platelet count is normal.      Serologic evaluation for markers of chronic liver disease was negative.    The most recent imaging of the liver was Ultrasound performed in 1/2024. Results   suggest steatotic liver disease (SLD). No liver mass lesions noted.    An assessment of liver fibrosis with biopsy or elastography has not been performed.      In the office today the patient has the following symptoms: fatigue, diffuse abdominal pain, nausea, vomiting, constipation, diarrhea    The

## 2024-02-15 PROBLEM — K76.0 FATTY LIVER: Status: ACTIVE | Noted: 2024-02-15

## 2024-02-15 LAB
A1AT SERPL-MCNC: 174 MG/DL (ref 100–188)
CERULOPLASMIN SERPL-MCNC: 30 MG/DL (ref 19–39)
HAV AB SER QL IA: NEGATIVE
HBV CORE AB SERPL QL IA: NEGATIVE
HBV SURFACE AB SER QL IA: NEGATIVE
HBV SURFACE AB SERPL IA-ACNC: <3.1 MIU/ML
HEP BS AB COMMENT: ABNORMAL

## 2024-02-16 LAB
C-ANCA TITR SER IF: NORMAL TITER
P-ANCA ATYPICAL TITR SER IF: NORMAL TITER
P-ANCA TITR SER IF: NORMAL TITER
SMA IGG SER-ACNC: 5 UNITS (ref 0–19)

## 2025-06-24 ENCOUNTER — OFFICE VISIT (OUTPATIENT)
Age: 30
End: 2025-06-24
Payer: COMMERCIAL

## 2025-06-24 VITALS
HEIGHT: 60 IN | TEMPERATURE: 98 F | BODY MASS INDEX: 41.43 KG/M2 | WEIGHT: 211 LBS | DIASTOLIC BLOOD PRESSURE: 88 MMHG | SYSTOLIC BLOOD PRESSURE: 124 MMHG | HEART RATE: 82 BPM | OXYGEN SATURATION: 99 %

## 2025-06-24 DIAGNOSIS — G43.009 MIGRAINE WITHOUT AURA AND WITHOUT STATUS MIGRAINOSUS, NOT INTRACTABLE: ICD-10-CM

## 2025-06-24 DIAGNOSIS — G47.419 PRIMARY NARCOLEPSY WITHOUT CATAPLEXY: Primary | ICD-10-CM

## 2025-06-24 DIAGNOSIS — R44.2 HYPNOPOMPIC HALLUCINATION: ICD-10-CM

## 2025-06-24 DIAGNOSIS — R06.83 SNORING: ICD-10-CM

## 2025-06-24 PROCEDURE — 99204 OFFICE O/P NEW MOD 45 MIN: CPT | Performed by: STUDENT IN AN ORGANIZED HEALTH CARE EDUCATION/TRAINING PROGRAM

## 2025-06-24 RX ORDER — TRAZODONE HYDROCHLORIDE 50 MG/1
50 TABLET ORAL NIGHTLY PRN
COMMUNITY
Start: 2025-03-31

## 2025-06-24 RX ORDER — BUDESONIDE AND FORMOTEROL FUMARATE DIHYDRATE 160; 4.5 UG/1; UG/1
AEROSOL RESPIRATORY (INHALATION)
COMMUNITY
Start: 2014-06-11

## 2025-06-24 RX ORDER — LORAZEPAM 0.5 MG/1
TABLET ORAL
COMMUNITY

## 2025-06-24 RX ORDER — CHOLECALCIFEROL (VITAMIN D3) 50 MCG
1 TABLET ORAL DAILY
COMMUNITY
Start: 2024-07-17

## 2025-06-24 RX ORDER — AZELASTINE 1 MG/ML
1 SPRAY, METERED NASAL PRN
COMMUNITY
Start: 2024-10-01

## 2025-06-24 ASSESSMENT — ENCOUNTER SYMPTOMS
SHORTNESS OF BREATH: 0
COUGH: 0
BACK PAIN: 0
VOMITING: 0
NAUSEA: 1

## 2025-08-01 ENCOUNTER — HOSPITAL ENCOUNTER (OUTPATIENT)
Facility: HOSPITAL | Age: 30
Discharge: HOME | End: 2025-08-01
Payer: COMMERCIAL

## 2025-08-01 DIAGNOSIS — R44.2 HYPNOPOMPIC HALLUCINATION: ICD-10-CM

## 2025-08-01 DIAGNOSIS — G47.419 PRIMARY NARCOLEPSY WITHOUT CATAPLEXY: ICD-10-CM

## 2025-08-01 PROCEDURE — 95816 EEG AWAKE AND DROWSY: CPT

## 2025-08-02 PROBLEM — G47.419 PRIMARY NARCOLEPSY WITHOUT CATAPLEXY: Status: ACTIVE | Noted: 2025-08-02

## 2025-08-02 PROCEDURE — 95816 EEG AWAKE AND DROWSY: CPT | Performed by: STUDENT IN AN ORGANIZED HEALTH CARE EDUCATION/TRAINING PROGRAM

## 2025-08-02 NOTE — PROCEDURES
PROCEDURE NOTE  Date: 8/2/2025   Name: Lana Mckinnon  YOB: 1995    Procedures: EEG  MR#:[id number]   DATE OF SERVICE: 8/1/2025     REFERRING PHYSICIAN:  Raul Gomez MD     EEG NUMBER:  MV EEG     Reason for the EEG: Evaluation of daytime sleepiness and morning disorientation.     Patient's MEDICATIONS:  PROZAC, ATIVAN, SYMBICORT, DESYREL PRN, ASTELIN    EEG technique: Standard 10-20 system of electrode placement with an EKG.  Activation procedures used were photic stimulation and hyperventilation.  Total duration of the recording was 30 minutes.  This is mainly an awake EEG recording.     EEG REPORT: The background consists of posterior dominant alpha rhythms at a frequency of 11 Hz and attenuate to eye opening.  No significant asymmetry between the right and left sides.  There are no obvious spikes or sharp wave discharges.  No focal slowing.  No abnormal discharges during photic stimulation and sleep.       IMPRESSION: This is a normal EEG recording    Clinical Correlation: Normal EEG does not rule out the possibility of seizures or epilepsy.      Raul Gomez MD

## 2025-08-20 ENCOUNTER — OFFICE VISIT (OUTPATIENT)
Age: 30
End: 2025-08-20
Payer: COMMERCIAL

## 2025-08-20 VITALS
HEART RATE: 98 BPM | SYSTOLIC BLOOD PRESSURE: 126 MMHG | BODY MASS INDEX: 42.01 KG/M2 | WEIGHT: 214 LBS | DIASTOLIC BLOOD PRESSURE: 80 MMHG | HEIGHT: 60 IN | RESPIRATION RATE: 18 BRPM | OXYGEN SATURATION: 97 % | TEMPERATURE: 97.6 F

## 2025-08-20 DIAGNOSIS — G47.63 BRUXISM, SLEEP-RELATED: ICD-10-CM

## 2025-08-20 DIAGNOSIS — G47.19 EXCESSIVE DAYTIME SLEEPINESS: ICD-10-CM

## 2025-08-20 DIAGNOSIS — E66.01 MORBID OBESITY WITH BMI OF 40.0-44.9, ADULT (HCC): ICD-10-CM

## 2025-08-20 DIAGNOSIS — G47.419 PRIMARY NARCOLEPSY WITHOUT CATAPLEXY: Primary | ICD-10-CM

## 2025-08-20 DIAGNOSIS — R44.2 HALLUCINATION, HYPNOPOMPIC: ICD-10-CM

## 2025-08-20 DIAGNOSIS — G47.33 OSA (OBSTRUCTIVE SLEEP APNEA): ICD-10-CM

## 2025-08-20 PROBLEM — J30.9 ALLERGIC RHINITIS: Status: ACTIVE | Noted: 2025-08-20

## 2025-08-20 PROBLEM — F17.210 CIGARETTE NICOTINE DEPENDENCE WITHOUT COMPLICATION: Status: ACTIVE | Noted: 2025-08-20

## 2025-08-20 PROBLEM — R74.8 ELEVATED LIVER ENZYMES: Status: ACTIVE | Noted: 2025-08-20

## 2025-08-20 PROBLEM — F41.9 ANXIETY: Status: ACTIVE | Noted: 2025-08-20

## 2025-08-20 PROCEDURE — 99204 OFFICE O/P NEW MOD 45 MIN: CPT | Performed by: OTOLARYNGOLOGY

## 2025-08-20 RX ORDER — ALBUTEROL SULFATE 90 UG/1
180 INHALANT RESPIRATORY (INHALATION) EVERY 4 HOURS
COMMUNITY

## 2025-08-20 RX ORDER — ALBUTEROL SULFATE AND BUDESONIDE 90; 80 UG/1; UG/1
AEROSOL, METERED RESPIRATORY (INHALATION)
COMMUNITY
Start: 2024-11-07

## 2025-08-20 ASSESSMENT — SLEEP AND FATIGUE QUESTIONNAIRES
HOW LIKELY ARE YOU TO NOD OFF OR FALL ASLEEP WHILE SITTING INACTIVE IN A PUBLIC PLACE: SLIGHT CHANCE OF DOZING
HOW LIKELY ARE YOU TO NOD OFF OR FALL ASLEEP WHILE SITTING AND TALKING TO SOMEONE: SLIGHT CHANCE OF DOZING
HOW LIKELY ARE YOU TO NOD OFF OR FALL ASLEEP WHILE SITTING AND READING: SLIGHT CHANCE OF DOZING
HOW LIKELY ARE YOU TO NOD OFF OR FALL ASLEEP WHILE SITTING QUIETLY AFTER LUNCH WITHOUT ALCOHOL: MODERATE CHANCE OF DOZING
HOW LIKELY ARE YOU TO NOD OFF OR FALL ASLEEP WHILE LYING DOWN TO REST IN THE AFTERNOON WHEN CIRCUMSTANCES PERMIT: MODERATE CHANCE OF DOZING
HOW LIKELY ARE YOU TO NOD OFF OR FALL ASLEEP IN A CAR, WHILE STOPPED FOR A FEW MINUTES IN TRAFFIC: WOULD NEVER DOZE
HOW LIKELY ARE YOU TO NOD OFF OR FALL ASLEEP WHILE WATCHING TV: MODERATE CHANCE OF DOZING
ESS TOTAL SCORE: 12
HOW LIKELY ARE YOU TO NOD OFF OR FALL ASLEEP WHEN YOU ARE A PASSENGER IN A CAR FOR AN HOUR WITHOUT A BREAK: HIGH CHANCE OF DOZING

## 2025-08-20 ASSESSMENT — PATIENT HEALTH QUESTIONNAIRE - PHQ9
SUM OF ALL RESPONSES TO PHQ QUESTIONS 1-9: 0
1. LITTLE INTEREST OR PLEASURE IN DOING THINGS: NOT AT ALL
2. FEELING DOWN, DEPRESSED OR HOPELESS: NOT AT ALL
SUM OF ALL RESPONSES TO PHQ QUESTIONS 1-9: 0